# Patient Record
Sex: FEMALE | Race: WHITE | NOT HISPANIC OR LATINO | URBAN - METROPOLITAN AREA
[De-identification: names, ages, dates, MRNs, and addresses within clinical notes are randomized per-mention and may not be internally consistent; named-entity substitution may affect disease eponyms.]

---

## 2021-04-04 ENCOUNTER — APPOINTMENT (OUTPATIENT)
Dept: PREADMISSION TESTING | Facility: HOSPITAL | Age: 66
End: 2021-04-04

## 2021-04-04 PROCEDURE — C9803 HOPD COVID-19 SPEC COLLECT: HCPCS

## 2021-04-04 PROCEDURE — U0004 COV-19 TEST NON-CDC HGH THRU: HCPCS

## 2021-04-04 PROCEDURE — U0005 INFEC AGEN DETEC AMPLI PROBE: HCPCS

## 2021-04-05 LAB — SARS-COV-2 RNA NOSE QL NAA+PROBE: NOT DETECTED

## 2021-05-16 ENCOUNTER — APPOINTMENT (OUTPATIENT)
Dept: PREADMISSION TESTING | Facility: HOSPITAL | Age: 66
End: 2021-05-16

## 2021-05-16 PROCEDURE — U0004 COV-19 TEST NON-CDC HGH THRU: HCPCS

## 2021-05-16 PROCEDURE — C9803 HOPD COVID-19 SPEC COLLECT: HCPCS

## 2021-05-16 PROCEDURE — U0005 INFEC AGEN DETEC AMPLI PROBE: HCPCS

## 2021-05-17 LAB — SARS-COV-2 RNA NOSE QL NAA+PROBE: NOT DETECTED

## 2022-08-17 ENCOUNTER — OFFICE VISIT (OUTPATIENT)
Dept: UROLOGY | Facility: CLINIC | Age: 67
End: 2022-08-17

## 2022-08-17 ENCOUNTER — TELEPHONE (OUTPATIENT)
Dept: UROLOGY | Facility: CLINIC | Age: 67
End: 2022-08-17

## 2022-08-17 VITALS — BODY MASS INDEX: 33.31 KG/M2 | HEIGHT: 63 IN | WEIGHT: 188 LBS

## 2022-08-17 DIAGNOSIS — R31.0 GROSS HEMATURIA: Primary | ICD-10-CM

## 2022-08-17 PROCEDURE — 99204 OFFICE O/P NEW MOD 45 MIN: CPT | Performed by: UROLOGY

## 2022-08-17 RX ORDER — SIMVASTATIN 10 MG
10 TABLET ORAL DAILY
COMMUNITY
Start: 2022-06-25

## 2022-08-17 RX ORDER — MELOXICAM 7.5 MG/1
7.5 TABLET ORAL DAILY
COMMUNITY
Start: 2022-06-07 | End: 2023-02-10

## 2022-08-17 RX ORDER — ALPRAZOLAM 0.25 MG/1
0.25 TABLET ORAL 3 TIMES DAILY PRN
COMMUNITY
Start: 2022-04-08

## 2022-08-17 RX ORDER — AZELASTINE HYDROCHLORIDE 137 UG/1
1 SPRAY, METERED NASAL DAILY PRN
COMMUNITY
Start: 2022-06-25

## 2022-08-17 NOTE — TELEPHONE ENCOUNTER
DELETE AFTER REVIEWING: Telephone encounter to be sent to the clinical pool   Hub staff attempted to follow warm transfer process and was unsuccessful     Caller: Nelly Marcial    Relationship to patient: Self    Best call back number: [phone]    Patient is needing: PT IS NEEDING A 10 DAY FOLLOW UP APPT FOR RECHECK WITH DR. AUSTIN LUTHER

## 2022-08-17 NOTE — PROGRESS NOTES
Hematuria Female Office Visit      Patient Name: Nelly Marcial  : 1955   MRN: [id number]     Chief Complaint:  Gross hematuria.     Referring Provider: Noemi Bernardo MD    History of Present Illness: Ms. Marcial is a 67 y.o. female with history of gross hematuria.  The patient presents today for initial evaluation after recent episode of gross hematuria.  She was seen by primary care physician.  Urinalysis without concerning markers.  She denies past urologic evaluation.  She denies any prior episode of recurrent UTI or hematuria.  The patient is a never smoker.  She denies family history of  malignancy.      Subjective      Review of System: Review of Systems   Constitutional: Negative for chills, fatigue, fever and unexpected weight change.   HENT: Negative for sore throat.    Eyes: Negative for visual disturbance.   Respiratory: Negative for cough, chest tightness and shortness of breath.    Cardiovascular: Negative for chest pain and leg swelling.   Gastrointestinal: Negative for blood in stool, constipation, diarrhea, nausea, rectal pain and vomiting.   Genitourinary: Negative for decreased urine volume, difficulty urinating, dysuria, enuresis, flank pain, frequency, genital sores, hematuria and urgency.   Musculoskeletal: Negative for back pain and joint swelling.   Skin: Negative for rash and wound.   Neurological: Negative for seizures, speech difficulty, weakness and headaches.   Psychiatric/Behavioral: Negative for confusion, sleep disturbance and suicidal ideas. The patient is not nervous/anxious.       I have reviewed the ROS documented by my clinical staff, updated as appropriate and I agree. Stan Nettles MD    Past Medical History:   Past Medical History:   Diagnosis Date   • Cancer (HCC)     basal cell/skin   • Hyperlipidemia    • Kidney stone        Past Surgical History:   Past Surgical History:   Procedure Laterality Date   • BREAST SURGERY         • HAND SURGERY       "knuckle right hand   • SHOULDER SURGERY         Family History:   Family History   Problem Relation Age of Onset   • Pulmonary fibrosis Father    • Diabetes Mother    • Cancer Sister    • Nephrolithiasis Sister        Social History:   Social History     Socioeconomic History   • Marital status:    Tobacco Use   • Smoking status: Never Smoker   Vaping Use   • Vaping Use: Never used   Substance and Sexual Activity   • Alcohol use: Never   • Drug use: Never   • Sexual activity: Defer       Medications:     Current Outpatient Medications:   •  ALPRAZolam (XANAX) 0.25 MG tablet, TAKE 1 TABLET (0.25 MG) BY ORAL ROUTE UP TO 3 TIMES PER DAY AS NEEDED, Disp: , Rfl:   •  Azelastine HCl 137 MCG/SPRAY solution, ADMINISTER 1 SPRAY INTO EACH NOSTRIL 2 TIMES A DAY AS DIRECTED, Disp: , Rfl:   •  meloxicam (MOBIC) 7.5 MG tablet, Take 7.5 mg by mouth Daily., Disp: , Rfl:   •  simvastatin (ZOCOR) 10 MG tablet, Take 10 mg by mouth Daily., Disp: , Rfl:   •  tretinoin (RETIN-A) 0.025 % cream, PLEASE SEE ATTACHED FOR DETAILED DIRECTIONS, Disp: , Rfl:     Allergies:   Allergies   Allergen Reactions   • Dust Mite Extract Unknown (See Comments)   • Molds & Smuts Unknown (See Comments)   • Shellfish Allergy Unknown (See Comments)   • Sulfa Antibiotics Rash   • Wheat Unknown (See Comments)         Objective     Physical Exam:   Vital Signs:   Vitals:    08/17/22 1254   Weight: 85.3 kg (188 lb)   Height: 160 cm (63\")   PainSc: 0-No pain     Body mass index is 33.3 kg/m².     Physical Exam  Vitals and nursing note reviewed.   Constitutional:       Appearance: Normal appearance.   HENT:      Head: Normocephalic and atraumatic.   Cardiovascular:      Comments: Well perfused  Pulmonary:      Effort: Pulmonary effort is normal.   Abdominal:      General: Abdomen is flat.      Palpations: Abdomen is soft.   Musculoskeletal:         General: Normal range of motion.   Skin:     General: Skin is warm and dry.   Neurological:      General: No " focal deficit present.      Mental Status: She is alert and oriented to person, place, and time. Mental status is at baseline.   Psychiatric:         Mood and Affect: Mood normal.         Behavior: Behavior normal.         Thought Content: Thought content normal.         Judgment: Judgment normal.         Labs:   Brief Urine Lab Results  (Last result in the past 365 days)      Color   Clarity   Blood   Leuk Est   Nitrite   Protein   CREAT   Urine HCG        07/21/22 0843 Yellow   Clear     Trace                          Lab Results   Component Value Date    CALCIUM 9.3 07/21/2022     07/21/2022    K 4.1 07/21/2022    CO2 24 07/21/2022     07/21/2022    BUN 12 07/21/2022    CREATININE 0.81 07/21/2022    EGFRIFAFRI >60 07/21/2022    EGFRIFNONA >60 07/21/2022    BCR 15 07/21/2022    ANIONGAP 11 07/21/2022       Lab Results   Component Value Date    WBC 6.92 07/21/2022    HGB 14.8 07/21/2022    HCT 45.2 (H) 07/21/2022    MCV 91 07/21/2022     (H) 07/21/2022       Images:   No Images in the past 120 days found..    Measures:   Tobacco:   Nelly Marcial  reports that she has never smoked. She does not have any smokeless tobacco history on file.. I have educated her on the risk of diseases from using tobacco products.           Urine Incontinence: ( NOUI)  Patient reports that she is not currently experiencing any symptoms of urinary incontinence.      Assessment / Plan      Assessment/Plan:   67 y.o. female who presented today for evaluation of pain less gross hematuria.  The patient denies significant history of lower urinary tract symptoms.  Patient is a never smoker.  She denies past urologic evaluation including instrumentation or procedure.    Diagnoses and all orders for this visit:    1. Gross hematuria (Primary)  -     CT Abdomen Pelvis With & Without Contrast; Future         Patient Education:   The patient was counseled regarding the possible etiologies, relevant work-up and diagnostic  "approach for gross hematuria, as well as the relevant risk categories as assigned by the American U[employer] Association guidelines.  I discussed that the definition of microscopic hematuria includes a microscopic urinalysis (not dipstick UA) positive for greater than 3 RBCs per high-powered field under microscopy.  We also discussed that any history of gross hematuria (or visible hematuria) places a patient at higher risk for occult urologic malignancies and necessitates prompt workup.  We discussed the aforementioned risk categories as assigned by the AUA, which are depicted below.  Ultimately, work-up is mandatory for gross or visible hematuria, as indicated by the \"HIGH RISK\" category and recommendations as depicted by the AUA Guidelines.  Given the patient's age,report of gross hematuria the patient is deemed HIGH risk, and for these reasons I recommend proceeding with a flexible diagnostic cystoscopy and CT urogram to assess the collecting system of the kidney and bladder.              Follow Up:   Return in about 10 days (around 8/27/2022) for Recheck, Follow up after Imaging.    I spent approximately 45 minutes providing clinical care for this patient; including review of patient's chart and provider documentation, face to face time spent with patient in examination room (obtaining history, performing physical exam, discussing diagnosis and management options), placing orders, and completing patient documentation.     Stan Nettles MD  [facility] [place]   "

## 2022-08-24 ENCOUNTER — HOSPITAL ENCOUNTER (OUTPATIENT)
Dept: CT IMAGING | Facility: HOSPITAL | Age: 67
Discharge: HOME | End: 2022-08-24
Admitting: UROLOGY

## 2022-08-24 DIAGNOSIS — R31.0 GROSS HEMATURIA: ICD-10-CM

## 2022-08-24 LAB — CREAT BLDA-MCNC: 0.7 MG/DL (ref 0.6–1.3)

## 2022-08-24 PROCEDURE — 82565 ASSAY OF CREATININE: CPT

## 2022-08-24 PROCEDURE — 74178 CT ABD&PLV WO CNTR FLWD CNTR: CPT

## 2022-08-24 PROCEDURE — 0 IOPAMIDOL PER 1 ML: Performed by: UROLOGY

## 2022-08-24 RX ADMIN — IOPAMIDOL 150 ML: 755 INJECTION, SOLUTION INTRAVENOUS at 11:39

## 2022-09-08 ENCOUNTER — PROCEDURE VISIT (OUTPATIENT)
Dept: UROLOGY | Facility: CLINIC | Age: 67
End: 2022-09-08

## 2022-09-08 DIAGNOSIS — N20.0 NEPHROLITHIASIS: Primary | ICD-10-CM

## 2022-09-08 PROCEDURE — 99215 OFFICE O/P EST HI 40 MIN: CPT | Performed by: UROLOGY

## 2022-09-09 ENCOUNTER — PREP FOR SURGERY (OUTPATIENT)
Dept: OTHER | Facility: HOSPITAL | Age: 67
End: 2022-09-09

## 2022-09-09 DIAGNOSIS — N20.0 NEPHROLITHIASIS: Primary | ICD-10-CM

## 2022-09-09 RX ORDER — SCOLOPAMINE TRANSDERMAL SYSTEM 1 MG/1
1 PATCH, EXTENDED RELEASE TRANSDERMAL CONTINUOUS
Status: CANCELLED | OUTPATIENT
Start: 2022-09-09 | End: 2022-09-12

## 2022-09-09 RX ORDER — CEFAZOLIN SODIUM 2 G/100ML
2 INJECTION, SOLUTION INTRAVENOUS ONCE
Status: CANCELLED | OUTPATIENT
Start: 2022-09-09 | End: 2022-09-09

## 2022-09-09 RX ORDER — GABAPENTIN 300 MG/1
600 CAPSULE ORAL ONCE
Status: CANCELLED | OUTPATIENT
Start: 2022-09-09 | End: 2022-09-09

## 2022-09-09 RX ORDER — ACETAMINOPHEN 500 MG
1000 TABLET ORAL ONCE
Status: CANCELLED | OUTPATIENT
Start: 2022-09-09 | End: 2022-09-09

## 2022-09-09 NOTE — H&P (VIEW-ONLY)
Follow Up Office Visit      Patient Name: Juan Marcial  : 1955   MRN: [id number]     Chief Complaint: Gross hematuria    History of Present Illness: Juan Marcial is a 67 y.o. female who presents today for follow up evaluation due to recent history of gross hematuria.  She presents today with CT urography and for cystoscopy.  CT urography has demonstrated large volume bilateral nonobstructing stone burden.  No concerning renal lesions, hydroureteronephrosis, collecting filling system defect.  She denies current flank pain.  She denies lower urinary tract symptoms.  She denies prior history of stone disease.  She reports a family history of stone disease in her sister.    Subjective      Review of System: Review of Systems   Constitutional: Negative for chills, fatigue, fever and unexpected weight change.   HENT: Negative for sore throat.    Eyes: Negative for visual disturbance.   Respiratory: Negative for cough, chest tightness and shortness of breath.    Cardiovascular: Negative for chest pain and leg swelling.   Gastrointestinal: Negative for blood in stool, constipation, diarrhea, nausea, rectal pain and vomiting.   Genitourinary: Negative for decreased urine volume, difficulty urinating, dysuria, enuresis, flank pain, frequency, genital sores, hematuria and urgency.   Musculoskeletal: Negative for back pain and joint swelling.   Skin: Negative for rash and wound.   Neurological: Negative for seizures, speech difficulty, weakness and headaches.   Psychiatric/Behavioral: Negative for confusion, sleep disturbance and suicidal ideas. The patient is not nervous/anxious.       I have reviewed the ROS documented by my clinical staff, updated as appropriate and I agree. Stan Nettles MD    I have reviewed and the following portions of the patient's history were updated as appropriate: past family history, past medical history, past social history, past surgical history and problem  list.    Medications:     Current Outpatient Medications:   •  ALPRAZolam (XANAX) 0.25 MG tablet, TAKE 1 TABLET (0.25 MG) BY ORAL ROUTE UP TO 3 TIMES PER DAY AS NEEDED, Disp: , Rfl:   •  Azelastine HCl 137 MCG/SPRAY solution, ADMINISTER 1 SPRAY INTO EACH NOSTRIL 2 TIMES A DAY AS DIRECTED, Disp: , Rfl:   •  meloxicam (MOBIC) 7.5 MG tablet, Take 7.5 mg by mouth Daily., Disp: , Rfl:   •  simvastatin (ZOCOR) 10 MG tablet, Take 10 mg by mouth Daily., Disp: , Rfl:   •  tretinoin (RETIN-A) 0.025 % cream, PLEASE SEE ATTACHED FOR DETAILED DIRECTIONS, Disp: , Rfl:     Allergies:   Allergies   Allergen Reactions   • Dust Mite Extract Unknown (See Comments)   • Molds & Smuts Unknown (See Comments)   • Shellfish Allergy Unknown (See Comments)   • Sulfa Antibiotics Rash   • Wheat Unknown (See Comments)         Objective     Physical Exam:   Vital Signs: There were no vitals filed for this visit.  There is no height or weight on file to calculate BMI.     Physical Exam  Vitals and nursing note reviewed.   Constitutional:       Appearance: Normal appearance.   HENT:      Head: Normocephalic and atraumatic.   Cardiovascular:      Comments: Well perfused  Pulmonary:      Effort: Pulmonary effort is normal.   Abdominal:      General: Abdomen is flat.      Palpations: Abdomen is soft.   Musculoskeletal:         General: Normal range of motion.   Skin:     General: Skin is warm and dry.   Neurological:      General: No focal deficit present.      Mental Status: She is alert and oriented to person, place, and time. Mental status is at baseline.   Psychiatric:         Mood and Affect: Mood normal.         Behavior: Behavior normal.         Thought Content: Thought content normal.         Judgment: Judgment normal.         Labs:   Brief Urine Lab Results  (Last result in the past 365 days)      Color   Clarity   Blood   Leuk Est   Nitrite   Protein   CREAT   Urine HCG        07/21/22 0843 Yellow   Clear     Trace                           Lab Results   Component Value Date    CALCIUM 9.3 07/21/2022     07/21/2022    K 4.1 07/21/2022    CO2 24 07/21/2022     07/21/2022    BUN 12 07/21/2022    CREATININE 0.70 08/24/2022    EGFRIFAFRI >60 07/21/2022    EGFRIFNONA >60 07/21/2022    BCR 15 07/21/2022    ANIONGAP 11 07/21/2022       Lab Results   Component Value Date    WBC 6.92 07/21/2022    HGB 14.8 07/21/2022    HCT 45.2 (H) 07/21/2022    MCV 91 07/21/2022     (H) 07/21/2022       Images:   CT Abdomen Pelvis With & Without Contrast    Result Date: 8/24/2022  1.  Nonobstructive stones are noted within the central aspect of the collecting system of the kidneys bilaterally measuring up to 8 mm. There is no evidence for associated obstructive uropathy. 2.  No evidence for abnormal renal enhancement or enhancing mass. Renal cysts are identified bilaterally. 3.  Evidence for rectal prolapse.  This report was finalized on 8/24/2022 4:25 PM by Kb De Souza MD.        Measures:   Tobacco:   Juan Marcial  reports that she has never smoked. She does not have any smokeless tobacco history on file.. I have educated her on the risk of diseases from using tobacco products.           Urine Incontinence: ( NOUI)  Patient reports that she is not currently experiencing any symptoms of urinary incontinence.    Assessment / Plan      Assessment/Plan:   67 y.o. female is seen today for follow up due to history of gross hematuria.  She presents today with CT urography.  Imaging has demonstrated large volume bilateral nonobstructing stone burden.  Stone burden on the right approximately 10 mm x 8 mm in the renal pelvis.  Stone burden on the left approximately 8 mm x 8 mm.  She has no evidence of hydronephrosis.  Imaging did not demonstrate concerning renal lesion, filling system defect.  The patient was scheduled for cystoscopy but we have discussed the indication for intervention based upon her large nonobstructing stone burden.  We have  discussed we will perform cystoscopy at the time of procedure to complete hematuria work-up.  We have discussed indication for treatment of stone burden due to large nature of stone size and she is agreeable.  We have discussed procedures including ESWL versus ureteroscopy.  We have discussed the risks and benefits of each.  After our discussion the patient elects for ureteroscopy.  We have discussed specific risks of this procedure including placement of ureteral stent postoperatively.  We have discussed that ureteral stents or not permanent she must follow-up for removal.  We have discussed the staged nature of her procedures as we will be performing right sided procedure, single-sided operation.  We will then schedule her for second sided left procedure following completion of her right-sided operation.  She is understanding agreeable.  We will obtain a urine culture today preoperatively.  We will plan for right ureteroscopy and laser lithotripsy with stent placement 9/23/2022.    Diagnoses and all orders for this visit:    1. Nephrolithiasis (Primary)  -     Urine Culture - Urine, Urine, Random Void; Future        I spent approximately 40 minutes providing clinical care for this patient; including review of patient's chart and provider documentation, face to face time spent with patient in examination room (obtaining history, performing physical exam, discussing diagnosis and management options), placing orders, and completing patient documentation.     Stan Nettles MD  [facility] [place]

## 2022-09-09 NOTE — PROGRESS NOTES
Follow Up Office Visit      Patient Name: Juan Marcial  : 1955   MRN: [id number]     Chief Complaint: Gross hematuria    History of Present Illness: Juan Marcial is a 67 y.o. female who presents today for follow up evaluation due to recent history of gross hematuria.  She presents today with CT urography and for cystoscopy.  CT urography has demonstrated large volume bilateral nonobstructing stone burden.  No concerning renal lesions, hydroureteronephrosis, collecting filling system defect.  She denies current flank pain.  She denies lower urinary tract symptoms.  She denies prior history of stone disease.  She reports a family history of stone disease in her sister.    Subjective      Review of System: Review of Systems   Constitutional: Negative for chills, fatigue, fever and unexpected weight change.   HENT: Negative for sore throat.    Eyes: Negative for visual disturbance.   Respiratory: Negative for cough, chest tightness and shortness of breath.    Cardiovascular: Negative for chest pain and leg swelling.   Gastrointestinal: Negative for blood in stool, constipation, diarrhea, nausea, rectal pain and vomiting.   Genitourinary: Negative for decreased urine volume, difficulty urinating, dysuria, enuresis, flank pain, frequency, genital sores, hematuria and urgency.   Musculoskeletal: Negative for back pain and joint swelling.   Skin: Negative for rash and wound.   Neurological: Negative for seizures, speech difficulty, weakness and headaches.   Psychiatric/Behavioral: Negative for confusion, sleep disturbance and suicidal ideas. The patient is not nervous/anxious.       I have reviewed the ROS documented by my clinical staff, updated as appropriate and I agree. Stan Nettles MD    I have reviewed and the following portions of the patient's history were updated as appropriate: past family history, past medical history, past social history, past surgical history and problem  list.    Medications:     Current Outpatient Medications:   •  ALPRAZolam (XANAX) 0.25 MG tablet, TAKE 1 TABLET (0.25 MG) BY ORAL ROUTE UP TO 3 TIMES PER DAY AS NEEDED, Disp: , Rfl:   •  Azelastine HCl 137 MCG/SPRAY solution, ADMINISTER 1 SPRAY INTO EACH NOSTRIL 2 TIMES A DAY AS DIRECTED, Disp: , Rfl:   •  meloxicam (MOBIC) 7.5 MG tablet, Take 7.5 mg by mouth Daily., Disp: , Rfl:   •  simvastatin (ZOCOR) 10 MG tablet, Take 10 mg by mouth Daily., Disp: , Rfl:   •  tretinoin (RETIN-A) 0.025 % cream, PLEASE SEE ATTACHED FOR DETAILED DIRECTIONS, Disp: , Rfl:     Allergies:   Allergies   Allergen Reactions   • Dust Mite Extract Unknown (See Comments)   • Molds & Smuts Unknown (See Comments)   • Shellfish Allergy Unknown (See Comments)   • Sulfa Antibiotics Rash   • Wheat Unknown (See Comments)         Objective     Physical Exam:   Vital Signs: There were no vitals filed for this visit.  There is no height or weight on file to calculate BMI.     Physical Exam  Vitals and nursing note reviewed.   Constitutional:       Appearance: Normal appearance.   HENT:      Head: Normocephalic and atraumatic.   Cardiovascular:      Comments: Well perfused  Pulmonary:      Effort: Pulmonary effort is normal.   Abdominal:      General: Abdomen is flat.      Palpations: Abdomen is soft.   Musculoskeletal:         General: Normal range of motion.   Skin:     General: Skin is warm and dry.   Neurological:      General: No focal deficit present.      Mental Status: She is alert and oriented to person, place, and time. Mental status is at baseline.   Psychiatric:         Mood and Affect: Mood normal.         Behavior: Behavior normal.         Thought Content: Thought content normal.         Judgment: Judgment normal.         Labs:   Brief Urine Lab Results  (Last result in the past 365 days)      Color   Clarity   Blood   Leuk Est   Nitrite   Protein   CREAT   Urine HCG        07/21/22 0843 Yellow   Clear     Trace                           Lab Results   Component Value Date    CALCIUM 9.3 07/21/2022     07/21/2022    K 4.1 07/21/2022    CO2 24 07/21/2022     07/21/2022    BUN 12 07/21/2022    CREATININE 0.70 08/24/2022    EGFRIFAFRI >60 07/21/2022    EGFRIFNONA >60 07/21/2022    BCR 15 07/21/2022    ANIONGAP 11 07/21/2022       Lab Results   Component Value Date    WBC 6.92 07/21/2022    HGB 14.8 07/21/2022    HCT 45.2 (H) 07/21/2022    MCV 91 07/21/2022     (H) 07/21/2022       Images:   CT Abdomen Pelvis With & Without Contrast    Result Date: 8/24/2022  1.  Nonobstructive stones are noted within the central aspect of the collecting system of the kidneys bilaterally measuring up to 8 mm. There is no evidence for associated obstructive uropathy. 2.  No evidence for abnormal renal enhancement or enhancing mass. Renal cysts are identified bilaterally. 3.  Evidence for rectal prolapse.  This report was finalized on 8/24/2022 4:25 PM by Kb De Souza MD.        Measures:   Tobacco:   Juan Marcial  reports that she has never smoked. She does not have any smokeless tobacco history on file.. I have educated her on the risk of diseases from using tobacco products.           Urine Incontinence: ( NOUI)  Patient reports that she is not currently experiencing any symptoms of urinary incontinence.    Assessment / Plan      Assessment/Plan:   67 y.o. female is seen today for follow up due to history of gross hematuria.  She presents today with CT urography.  Imaging has demonstrated large volume bilateral nonobstructing stone burden.  Stone burden on the right approximately 10 mm x 8 mm in the renal pelvis.  Stone burden on the left approximately 8 mm x 8 mm.  She has no evidence of hydronephrosis.  Imaging did not demonstrate concerning renal lesion, filling system defect.  The patient was scheduled for cystoscopy but we have discussed the indication for intervention based upon her large nonobstructing stone burden.  We have  discussed we will perform cystoscopy at the time of procedure to complete hematuria work-up.  We have discussed indication for treatment of stone burden due to large nature of stone size and she is agreeable.  We have discussed procedures including ESWL versus ureteroscopy.  We have discussed the risks and benefits of each.  After our discussion the patient elects for ureteroscopy.  We have discussed specific risks of this procedure including placement of ureteral stent postoperatively.  We have discussed that ureteral stents or not permanent she must follow-up for removal.  We have discussed the staged nature of her procedures as we will be performing right sided procedure, single-sided operation.  We will then schedule her for second sided left procedure following completion of her right-sided operation.  She is understanding agreeable.  We will obtain a urine culture today preoperatively.  We will plan for right ureteroscopy and laser lithotripsy with stent placement 9/23/2022.    Diagnoses and all orders for this visit:    1. Nephrolithiasis (Primary)  -     Urine Culture - Urine, Urine, Random Void; Future        I spent approximately 40 minutes providing clinical care for this patient; including review of patient's chart and provider documentation, face to face time spent with patient in examination room (obtaining history, performing physical exam, discussing diagnosis and management options), placing orders, and completing patient documentation.     Stan Nettles MD  [facility] [place]

## 2022-09-19 ENCOUNTER — TELEPHONE (OUTPATIENT)
Dept: UROLOGY | Facility: CLINIC | Age: 67
End: 2022-09-19

## 2022-09-19 NOTE — TELEPHONE ENCOUNTER
"Patient left message:   Patient states she started to have \"quite a bit of blood in urine this morning.\"      Patient would like to know if this is a problem moving forward with surgery on 09/23/22  "

## 2022-09-20 ENCOUNTER — PRE-ADMISSION TESTING (OUTPATIENT)
Dept: PREADMISSION TESTING | Facility: HOSPITAL | Age: 67
End: 2022-09-20

## 2022-09-20 VITALS — HEIGHT: 63 IN | BODY MASS INDEX: 33.48 KG/M2 | WEIGHT: 188.93 LBS

## 2022-09-20 DIAGNOSIS — N20.0 NEPHROLITHIASIS: ICD-10-CM

## 2022-09-20 LAB
DEPRECATED RDW RBC AUTO: 43.2 FL (ref 37–54)
ERYTHROCYTE [DISTWIDTH] IN BLOOD BY AUTOMATED COUNT: 13 % (ref 12.3–15.4)
HBA1C MFR BLD: 5.5 % (ref 4.8–5.6)
HCT VFR BLD AUTO: 44.7 % (ref 34–46.6)
HGB BLD-MCNC: 14.5 G/DL (ref 12–15.9)
MCH RBC QN AUTO: 29.4 PG (ref 26.6–33)
MCHC RBC AUTO-ENTMCNC: 32.4 G/DL (ref 31.5–35.7)
MCV RBC AUTO: 90.7 FL (ref 79–97)
PLATELET # BLD AUTO: 337 10*3/MM3 (ref 140–450)
PMV BLD AUTO: 9.6 FL (ref 6–12)
RBC # BLD AUTO: 4.93 10*6/MM3 (ref 3.77–5.28)
WBC NRBC COR # BLD: 7.1 10*3/MM3 (ref 3.4–10.8)

## 2022-09-20 PROCEDURE — 36415 COLL VENOUS BLD VENIPUNCTURE: CPT

## 2022-09-20 PROCEDURE — 87086 URINE CULTURE/COLONY COUNT: CPT

## 2022-09-20 PROCEDURE — 85027 COMPLETE CBC AUTOMATED: CPT

## 2022-09-20 PROCEDURE — 83036 HEMOGLOBIN GLYCOSYLATED A1C: CPT

## 2022-09-21 ENCOUNTER — TELEPHONE (OUTPATIENT)
Dept: UROLOGY | Facility: CLINIC | Age: 67
End: 2022-09-21

## 2022-09-21 DIAGNOSIS — Z41.9 SURGICAL PROCEDURE, ELECTIVE: Primary | ICD-10-CM

## 2022-09-21 DIAGNOSIS — N20.0 RENAL STONE: Primary | ICD-10-CM

## 2022-09-21 LAB — BACTERIA SPEC AEROBE CULT: NO GROWTH

## 2022-09-21 RX ORDER — CEFDINIR 300 MG/1
300 CAPSULE ORAL 2 TIMES DAILY
Qty: 14 CAPSULE | Refills: 0 | Status: ON HOLD | OUTPATIENT
Start: 2022-09-21 | End: 2022-09-23

## 2022-09-21 RX ORDER — NITROFURANTOIN 25; 75 MG/1; MG/1
100 CAPSULE ORAL 2 TIMES DAILY
Qty: 14 CAPSULE | Refills: 0 | Status: SHIPPED | OUTPATIENT
Start: 2022-09-21 | End: 2022-09-29

## 2022-09-21 NOTE — TELEPHONE ENCOUNTER
----- Message from Stan Nettles MD sent at 9/21/2022  7:21 AM EDT -----  Can we alert patient pre-op ucx was no growth but still sent pre-surgery abx to pharmacy to pick and start taking prior to procedure Friday. Thanks

## 2022-09-21 NOTE — TELEPHONE ENCOUNTER
Patient stated she is allergic to sulfa drugs, she stated the pharmacy told her that cefdinir is sulfa drug. Please advise.

## 2022-09-23 ENCOUNTER — APPOINTMENT (OUTPATIENT)
Dept: GENERAL RADIOLOGY | Facility: HOSPITAL | Age: 67
End: 2022-09-23

## 2022-09-23 ENCOUNTER — HOSPITAL ENCOUNTER (OUTPATIENT)
Facility: HOSPITAL | Age: 67
Setting detail: HOSPITAL OUTPATIENT SURGERY
Discharge: HOME | End: 2022-09-23
Attending: UROLOGY | Admitting: UROLOGY

## 2022-09-23 ENCOUNTER — ANESTHESIA EVENT (OUTPATIENT)
Dept: PERIOP | Facility: HOSPITAL | Age: 67
End: 2022-09-23

## 2022-09-23 ENCOUNTER — ANESTHESIA (OUTPATIENT)
Dept: PERIOP | Facility: HOSPITAL | Age: 67
End: 2022-09-23

## 2022-09-23 VITALS
HEIGHT: 63 IN | TEMPERATURE: 97.6 F | WEIGHT: 188.93 LBS | RESPIRATION RATE: 16 BRPM | SYSTOLIC BLOOD PRESSURE: 116 MMHG | HEART RATE: 80 BPM | DIASTOLIC BLOOD PRESSURE: 74 MMHG | BODY MASS INDEX: 33.48 KG/M2 | OXYGEN SATURATION: 97 %

## 2022-09-23 DIAGNOSIS — N20.0 NEPHROLITHIASIS: ICD-10-CM

## 2022-09-23 PROCEDURE — 25010000002 ONDANSETRON PER 1 MG

## 2022-09-23 PROCEDURE — 25010000002 PROPOFOL 10 MG/ML EMULSION

## 2022-09-23 PROCEDURE — C2617 STENT, NON-COR, TEM W/O DEL: HCPCS | Performed by: UROLOGY

## 2022-09-23 PROCEDURE — 76000 FLUOROSCOPY <1 HR PHYS/QHP: CPT

## 2022-09-23 PROCEDURE — 25010000002 CEFAZOLIN IN DEXTROSE 2-4 GM/100ML-% SOLUTION: Performed by: UROLOGY

## 2022-09-23 PROCEDURE — C1769 GUIDE WIRE: HCPCS | Performed by: UROLOGY

## 2022-09-23 PROCEDURE — 25010000002 FENTANYL CITRATE (PF) 50 MCG/ML SOLUTION

## 2022-09-23 PROCEDURE — 52356 CYSTO/URETERO W/LITHOTRIPSY: CPT | Performed by: UROLOGY

## 2022-09-23 PROCEDURE — C1894 INTRO/SHEATH, NON-LASER: HCPCS | Performed by: UROLOGY

## 2022-09-23 PROCEDURE — 25010000002 DEXAMETHASONE PER 1 MG

## 2022-09-23 PROCEDURE — 25010000002 IOPAMIDOL 61 % SOLUTION: Performed by: UROLOGY

## 2022-09-23 PROCEDURE — 25010000002 GENTAMICIN PER 80 MG

## 2022-09-23 PROCEDURE — 74420 UROGRAPHY RTRGR +-KUB: CPT | Performed by: UROLOGY

## 2022-09-23 DEVICE — URETERAL STENT
Type: IMPLANTABLE DEVICE | Site: KIDNEY | Status: FUNCTIONAL
Brand: PERCUFLEX™ PLUS

## 2022-09-23 RX ORDER — HYDROCODONE BITARTRATE AND ACETAMINOPHEN 5; 325 MG/1; MG/1
1 TABLET ORAL ONCE AS NEEDED
Status: DISCONTINUED | OUTPATIENT
Start: 2022-09-23 | End: 2022-09-27 | Stop reason: HOSPADM

## 2022-09-23 RX ORDER — LIDOCAINE HYDROCHLORIDE 10 MG/ML
0.5 INJECTION, SOLUTION EPIDURAL; INFILTRATION; INTRACAUDAL; PERINEURAL ONCE AS NEEDED
Status: COMPLETED | OUTPATIENT
Start: 2022-09-23 | End: 2022-09-23

## 2022-09-23 RX ORDER — PHENAZOPYRIDINE HYDROCHLORIDE 200 MG/1
200 TABLET, FILM COATED ORAL 3 TIMES DAILY PRN
Qty: 20 TABLET | Refills: 0 | Status: SHIPPED | OUTPATIENT
Start: 2022-09-23 | End: 2023-02-10

## 2022-09-23 RX ORDER — DROPERIDOL 2.5 MG/ML
0.62 INJECTION, SOLUTION INTRAMUSCULAR; INTRAVENOUS
Status: DISCONTINUED | OUTPATIENT
Start: 2022-09-23 | End: 2022-09-27 | Stop reason: HOSPADM

## 2022-09-23 RX ORDER — MAGNESIUM HYDROXIDE 1200 MG/15ML
LIQUID ORAL AS NEEDED
Status: DISCONTINUED | OUTPATIENT
Start: 2022-09-23 | End: 2022-09-23 | Stop reason: HOSPADM

## 2022-09-23 RX ORDER — MEPERIDINE HYDROCHLORIDE 25 MG/ML
12.5 INJECTION INTRAMUSCULAR; INTRAVENOUS; SUBCUTANEOUS
Status: DISCONTINUED | OUTPATIENT
Start: 2022-09-23 | End: 2022-09-24 | Stop reason: HOSPADM

## 2022-09-23 RX ORDER — TAMSULOSIN HYDROCHLORIDE 0.4 MG/1
1 CAPSULE ORAL DAILY
Qty: 14 CAPSULE | Refills: 0 | Status: SHIPPED | OUTPATIENT
Start: 2022-09-23 | End: 2022-10-07

## 2022-09-23 RX ORDER — DROPERIDOL 2.5 MG/ML
0.62 INJECTION, SOLUTION INTRAMUSCULAR; INTRAVENOUS ONCE AS NEEDED
Status: DISCONTINUED | OUTPATIENT
Start: 2022-09-23 | End: 2022-09-27 | Stop reason: HOSPADM

## 2022-09-23 RX ORDER — SODIUM CHLORIDE 0.9 % (FLUSH) 0.9 %
10 SYRINGE (ML) INJECTION AS NEEDED
Status: DISCONTINUED | OUTPATIENT
Start: 2022-09-23 | End: 2022-09-23 | Stop reason: HOSPADM

## 2022-09-23 RX ORDER — PROPOFOL 10 MG/ML
VIAL (ML) INTRAVENOUS AS NEEDED
Status: DISCONTINUED | OUTPATIENT
Start: 2022-09-23 | End: 2022-09-23 | Stop reason: SURG

## 2022-09-23 RX ORDER — FAMOTIDINE 20 MG/1
20 TABLET, FILM COATED ORAL
Status: DISCONTINUED | OUTPATIENT
Start: 2022-09-23 | End: 2022-09-23 | Stop reason: SDUPTHER

## 2022-09-23 RX ORDER — SODIUM CHLORIDE 0.9 % (FLUSH) 0.9 %
10 SYRINGE (ML) INJECTION EVERY 12 HOURS SCHEDULED
Status: DISCONTINUED | OUTPATIENT
Start: 2022-09-23 | End: 2022-09-23 | Stop reason: HOSPADM

## 2022-09-23 RX ORDER — CEFAZOLIN SODIUM 2 G/100ML
2 INJECTION, SOLUTION INTRAVENOUS ONCE
Status: COMPLETED | OUTPATIENT
Start: 2022-09-23 | End: 2022-09-23

## 2022-09-23 RX ORDER — FENTANYL CITRATE 50 UG/ML
INJECTION, SOLUTION INTRAMUSCULAR; INTRAVENOUS AS NEEDED
Status: DISCONTINUED | OUTPATIENT
Start: 2022-09-23 | End: 2022-09-23 | Stop reason: SURG

## 2022-09-23 RX ORDER — FAMOTIDINE 20 MG/1
20 TABLET, FILM COATED ORAL ONCE
Status: COMPLETED | OUTPATIENT
Start: 2022-09-23 | End: 2022-09-23

## 2022-09-23 RX ORDER — ACETAMINOPHEN 500 MG
1000 TABLET ORAL ONCE
Status: COMPLETED | OUTPATIENT
Start: 2022-09-23 | End: 2022-09-23

## 2022-09-23 RX ORDER — HYDRALAZINE HYDROCHLORIDE 20 MG/ML
5 INJECTION INTRAMUSCULAR; INTRAVENOUS
Status: DISCONTINUED | OUTPATIENT
Start: 2022-09-23 | End: 2022-09-27 | Stop reason: HOSPADM

## 2022-09-23 RX ORDER — ONDANSETRON 2 MG/ML
INJECTION INTRAMUSCULAR; INTRAVENOUS AS NEEDED
Status: DISCONTINUED | OUTPATIENT
Start: 2022-09-23 | End: 2022-09-23 | Stop reason: SURG

## 2022-09-23 RX ORDER — LABETALOL HYDROCHLORIDE 5 MG/ML
5 INJECTION, SOLUTION INTRAVENOUS
Status: DISCONTINUED | OUTPATIENT
Start: 2022-09-23 | End: 2022-09-27 | Stop reason: HOSPADM

## 2022-09-23 RX ORDER — ONDANSETRON 2 MG/ML
4 INJECTION INTRAMUSCULAR; INTRAVENOUS ONCE AS NEEDED
Status: DISCONTINUED | OUTPATIENT
Start: 2022-09-23 | End: 2022-09-27 | Stop reason: HOSPADM

## 2022-09-23 RX ORDER — IPRATROPIUM BROMIDE AND ALBUTEROL SULFATE 2.5; .5 MG/3ML; MG/3ML
3 SOLUTION RESPIRATORY (INHALATION) ONCE AS NEEDED
Status: DISCONTINUED | OUTPATIENT
Start: 2022-09-23 | End: 2022-09-27 | Stop reason: HOSPADM

## 2022-09-23 RX ORDER — DEXAMETHASONE SODIUM PHOSPHATE 4 MG/ML
INJECTION, SOLUTION INTRA-ARTICULAR; INTRALESIONAL; INTRAMUSCULAR; INTRAVENOUS; SOFT TISSUE AS NEEDED
Status: DISCONTINUED | OUTPATIENT
Start: 2022-09-23 | End: 2022-09-23 | Stop reason: SURG

## 2022-09-23 RX ORDER — SODIUM CHLORIDE, SODIUM LACTATE, POTASSIUM CHLORIDE, CALCIUM CHLORIDE 600; 310; 30; 20 MG/100ML; MG/100ML; MG/100ML; MG/100ML
9 INJECTION, SOLUTION INTRAVENOUS CONTINUOUS PRN
Status: DISCONTINUED | OUTPATIENT
Start: 2022-09-23 | End: 2022-09-27 | Stop reason: HOSPADM

## 2022-09-23 RX ORDER — SODIUM CHLORIDE 0.9 % (FLUSH) 0.9 %
3 SYRINGE (ML) INJECTION EVERY 12 HOURS SCHEDULED
Status: DISCONTINUED | OUTPATIENT
Start: 2022-09-23 | End: 2022-09-27 | Stop reason: HOSPADM

## 2022-09-23 RX ORDER — PROMETHAZINE HYDROCHLORIDE 25 MG/1
25 TABLET ORAL ONCE AS NEEDED
Status: DISCONTINUED | OUTPATIENT
Start: 2022-09-23 | End: 2022-09-27 | Stop reason: HOSPADM

## 2022-09-23 RX ORDER — EPHEDRINE SULFATE 50 MG/ML
INJECTION, SOLUTION INTRAVENOUS AS NEEDED
Status: DISCONTINUED | OUTPATIENT
Start: 2022-09-23 | End: 2022-09-23 | Stop reason: SURG

## 2022-09-23 RX ORDER — SODIUM CHLORIDE 0.9 % (FLUSH) 0.9 %
3-10 SYRINGE (ML) INJECTION AS NEEDED
Status: DISCONTINUED | OUTPATIENT
Start: 2022-09-23 | End: 2022-09-27 | Stop reason: HOSPADM

## 2022-09-23 RX ORDER — SODIUM CHLORIDE, SODIUM LACTATE, POTASSIUM CHLORIDE, CALCIUM CHLORIDE 600; 310; 30; 20 MG/100ML; MG/100ML; MG/100ML; MG/100ML
9 INJECTION, SOLUTION INTRAVENOUS CONTINUOUS
Status: DISCONTINUED | OUTPATIENT
Start: 2022-09-23 | End: 2022-09-27 | Stop reason: HOSPADM

## 2022-09-23 RX ORDER — FENTANYL CITRATE 50 UG/ML
50 INJECTION, SOLUTION INTRAMUSCULAR; INTRAVENOUS
Status: DISCONTINUED | OUTPATIENT
Start: 2022-09-23 | End: 2022-09-27 | Stop reason: HOSPADM

## 2022-09-23 RX ORDER — NALOXONE HCL 0.4 MG/ML
0.4 VIAL (ML) INJECTION AS NEEDED
Status: DISCONTINUED | OUTPATIENT
Start: 2022-09-23 | End: 2022-09-27 | Stop reason: HOSPADM

## 2022-09-23 RX ORDER — OXYBUTYNIN CHLORIDE 5 MG/1
5 TABLET, EXTENDED RELEASE ORAL DAILY
Qty: 14 TABLET | Refills: 0 | Status: SHIPPED | OUTPATIENT
Start: 2022-09-23 | End: 2023-02-10

## 2022-09-23 RX ORDER — GABAPENTIN 300 MG/1
600 CAPSULE ORAL ONCE
Status: COMPLETED | OUTPATIENT
Start: 2022-09-23 | End: 2022-09-23

## 2022-09-23 RX ORDER — PROMETHAZINE HYDROCHLORIDE 25 MG/1
25 SUPPOSITORY RECTAL ONCE AS NEEDED
Status: DISCONTINUED | OUTPATIENT
Start: 2022-09-23 | End: 2022-09-27 | Stop reason: HOSPADM

## 2022-09-23 RX ORDER — FAMOTIDINE 10 MG/ML
20 INJECTION, SOLUTION INTRAVENOUS ONCE
Status: DISCONTINUED | OUTPATIENT
Start: 2022-09-23 | End: 2022-09-23 | Stop reason: HOSPADM

## 2022-09-23 RX ORDER — LIDOCAINE HYDROCHLORIDE 10 MG/ML
INJECTION, SOLUTION EPIDURAL; INFILTRATION; INTRACAUDAL; PERINEURAL AS NEEDED
Status: DISCONTINUED | OUTPATIENT
Start: 2022-09-23 | End: 2022-09-23 | Stop reason: SURG

## 2022-09-23 RX ORDER — MIDAZOLAM HYDROCHLORIDE 1 MG/ML
0.5 INJECTION INTRAMUSCULAR; INTRAVENOUS
Status: DISCONTINUED | OUTPATIENT
Start: 2022-09-23 | End: 2022-09-23 | Stop reason: HOSPADM

## 2022-09-23 RX ORDER — GENTAMICIN SULFATE 40 MG/ML
INJECTION, SOLUTION INTRAMUSCULAR; INTRAVENOUS AS NEEDED
Status: DISCONTINUED | OUTPATIENT
Start: 2022-09-23 | End: 2022-09-23 | Stop reason: SURG

## 2022-09-23 RX ORDER — LIDOCAINE HYDROCHLORIDE 10 MG/ML
0.5 INJECTION, SOLUTION EPIDURAL; INFILTRATION; INTRACAUDAL; PERINEURAL ONCE AS NEEDED
Status: DISCONTINUED | OUTPATIENT
Start: 2022-09-23 | End: 2022-09-23 | Stop reason: HOSPADM

## 2022-09-23 RX ADMIN — EPHEDRINE SULFATE 10 MG: 50 INJECTION INTRAVENOUS at 08:29

## 2022-09-23 RX ADMIN — LIDOCAINE HYDROCHLORIDE 50 MG: 10 INJECTION, SOLUTION EPIDURAL; INFILTRATION; INTRACAUDAL; PERINEURAL at 08:15

## 2022-09-23 RX ADMIN — EPHEDRINE SULFATE 10 MG: 50 INJECTION INTRAVENOUS at 08:55

## 2022-09-23 RX ADMIN — SODIUM CHLORIDE, POTASSIUM CHLORIDE, SODIUM LACTATE AND CALCIUM CHLORIDE 9 ML/HR: 600; 310; 30; 20 INJECTION, SOLUTION INTRAVENOUS at 07:42

## 2022-09-23 RX ADMIN — ONDANSETRON 4 MG: 2 INJECTION INTRAMUSCULAR; INTRAVENOUS at 08:18

## 2022-09-23 RX ADMIN — PROPOFOL 200 MG: 10 INJECTION, EMULSION INTRAVENOUS at 08:15

## 2022-09-23 RX ADMIN — CEFAZOLIN SODIUM 2 G: 2 INJECTION, SOLUTION INTRAVENOUS at 08:15

## 2022-09-23 RX ADMIN — DEXAMETHASONE SODIUM PHOSPHATE 8 MG: 4 INJECTION, SOLUTION INTRA-ARTICULAR; INTRALESIONAL; INTRAMUSCULAR; INTRAVENOUS; SOFT TISSUE at 08:18

## 2022-09-23 RX ADMIN — EPHEDRINE SULFATE 10 MG: 50 INJECTION INTRAVENOUS at 08:44

## 2022-09-23 RX ADMIN — FAMOTIDINE 20 MG: 20 TABLET ORAL at 07:42

## 2022-09-23 RX ADMIN — FENTANYL CITRATE 100 MCG: 50 INJECTION, SOLUTION INTRAMUSCULAR; INTRAVENOUS at 08:15

## 2022-09-23 RX ADMIN — GABAPENTIN 600 MG: 300 CAPSULE ORAL at 07:42

## 2022-09-23 RX ADMIN — GENTAMICIN SULFATE 240 MG: 40 INJECTION, SOLUTION INTRAMUSCULAR; INTRAVENOUS at 08:38

## 2022-09-23 RX ADMIN — ACETAMINOPHEN 1000 MG: 500 TABLET, FILM COATED ORAL at 07:42

## 2022-09-23 RX ADMIN — LIDOCAINE HYDROCHLORIDE 0.5 ML: 10 INJECTION, SOLUTION EPIDURAL; INFILTRATION; INTRACAUDAL; PERINEURAL at 07:42

## 2022-09-23 NOTE — ANESTHESIA PROCEDURE NOTES
Airway  Urgency: elective    Date/Time: 9/23/2022 8:16 AM  Airway not difficult    General Information and Staff    Patient location during procedure: OR  CRNA/CAA: Glenys Candelaria CRNA    Indications and Patient Condition  Indications for airway management: airway protection    Preoxygenated: yes  Mask difficulty assessment: 1 - vent by mask    Final Airway Details  Final airway type: supraglottic airway      Successful airway: I-gel  Size 4    Number of attempts at approach: 1  Assessment: lips, teeth, and gum same as pre-op    Additional Comments  LMA placed without difficulty, ventilation with assist, equal breath sounds and symmetric chest rise and fall

## 2022-09-23 NOTE — INTERVAL H&P NOTE
"  Pre-Op H&P (See Recent Office Note Attached for Full H&P)    History and physical note from office reviewed and updated with the following, otherwise there are no changes in H&P:      Review of Systems:  General ROS:  no fever, chills, rashes.  No change since last office visit.  No recent sick exposure  Cardiovascular ROS: no chest pain or dyspnea on exertion  Respiratory ROS: no cough, shortness of breath, or wheezing    Immunization History:   Influenza:  no   Pneumococcal:  no   Tetanus:  yes     Meds:    No current facility-administered medications on file prior to encounter.     Current Outpatient Medications on File Prior to Encounter   Medication Sig Dispense Refill    ALPRAZolam (XANAX) 0.25 MG tablet Take 0.25 mg by mouth 3 (Three) Times a Day As Needed. Has not taken \"in a long time\"      Azelastine HCl 137 MCG/SPRAY solution 1 spray into the nostril(s) as directed by provider Daily As Needed.      meloxicam (MOBIC) 7.5 MG tablet Take 7.5 mg by mouth Daily.      simvastatin (ZOCOR) 10 MG tablet Take 10 mg by mouth Daily.      tretinoin (RETIN-A) 0.025 % cream Apply 1 application topically to the appropriate area as directed Every Night.         Vital Signs:  OJ=351/85     HR=69     SpO2=96% room air     Temp=98.4    Physical Exam:    CV:  S1S2 regular rate and rhythm, no murmur               Resp:  Clear to auscultation; respirations regular, even and unlabored    Results Review:     Lab Results   Component Value Date    WBC 7.10 09/20/2022    HGB 14.5 09/20/2022    HCT 44.7 09/20/2022    MCV 90.7 09/20/2022     09/20/2022    NEUTROABS 4.41 10/12/2021    BUN 12 07/21/2022    CREATININE 0.70 08/24/2022    EGFRIFNONA >60 07/21/2022    EGFRIFAFRI >60 07/21/2022     07/21/2022    K 4.1 07/21/2022     07/21/2022    CO2 24 07/21/2022    CALCIUM 9.3 07/21/2022    ALBUMIN 4.4 07/21/2022    AST 15 07/21/2022    ALT 16 07/21/2022    BILITOT 0.4 07/21/2022   I reviewed the patient's new clinical " results.    Cancer Staging (if applicable)  Cancer Patient: __ yes __no __unknown; If yes, clinical stage T:__ N:__M:__, stage group or __N/A    Assessment/Plan:   Nephrolithiasis; Gross hematuria  /    URETEROSCOPY LASER LITHOTRIPSY WITH STENT INSERTION      ROSE Key   9/23/2022   07:03 EDT

## 2022-09-23 NOTE — ANESTHESIA PREPROCEDURE EVALUATION
Anesthesia Evaluation     Patient summary reviewed and Nursing notes reviewed   no history of anesthetic complications:  NPO Solid Status: > 8 hours  NPO Liquid Status: > 2 hours           Airway   Mallampati: I  TM distance: >3 FB  Neck ROM: full  No difficulty expected  Dental - normal exam     Pulmonary - normal exam   (-) not a smoker  Cardiovascular - normal exam    ECG reviewed    (+) valvular problems/murmurs murmur, hyperlipidemia,       Neuro/Psych  (-) seizures, CVA  GI/Hepatic/Renal/Endo    (+) obesity,   renal disease stones,     Musculoskeletal     Abdominal    Substance History      OB/GYN          Other                        Anesthesia Plan    ASA 2     general     intravenous induction     Anesthetic plan, risks, benefits, and alternatives have been provided, discussed and informed consent has been obtained with: patient.    Plan discussed with CRNA.        CODE STATUS:

## 2022-09-23 NOTE — OP NOTE
URETEROSCOPY LASER LITHOTRIPSY WITH STENT INSERTION  Procedure Report    Patient Name:  Juan Marcial  YOB: 1955    Date of Surgery:  9/23/2022     Indications: 67-year-old female presenting for right ureteroscopy and laser lithotripsy based upon known nonobstructing 1 cm right renal pelvis stone.  The risk benefits and alternatives to ureteroscopy and laser lithotripsy were discussed with the patient she elected proceed    Pre-op Diagnosis:   Nephrolithiasis [N20.0]       Post-Op Diagnosis Codes:     * Nephrolithiasis [N20.0]          Procedure(s):  CYSTOURETHROSCOPY  RIGHT URETEROSCOPY LASER LITHOTRIPSY   RIGHT RETROGRADE PYELOGRAM  RIGHT URETERAL STENT PLACEMENT    Staff:  Surgeon(s):  Stan Nettles MD         Anesthesia: General    Estimated Blood Loss: none    Implants:    Implant Name Type Inv. Item Serial No.  Lot No. LRB No. Used Action   STNT PERCUFLX NO GW 4.8X24 - [id number] Stent STNT PERCUFLX NO GW 4.8X24  [employer] [id number] Right 1 Implanted       Specimen:          None        Findings:   1.  Normal urinary bladder without evidence of tumor stone or foreign body on diagnostic cystoscopy  2.  Right ureteroscopy with clearance of distal mid and proximal ureter.  3.  Right pyeloscopy with identification of approximately 1 cm renal pelvis stone.  Laser lithotripsy performed with dusting of stone.  4.  Right retrograde pyelogram with mildly dilated right renal collecting system noted  5.  Successful placement of 4.8 Maltese by 24 cm double-J ureteral stent without string    Complications: None immediate    Description of Procedure:     After informed consent, the patient was brought back to the operating suite and moved over to the operating table. General anesthesia was smoothly induced, IV antibiotics were administered, and the patient was placed in the dorsal lithotomy position with careful attention focused on padding all pressure points. The patient was  prepped and draped in standard fashion. A timeout was performed to ensure the correct patient and procedure    A 22Fr cystoscope was used to cannulate the urethra. The urethra was of normal course and caliber.  Upon entering the bladder, pan-cystoscopy revealed no bladder abnormalities. There were no stones, no diverticuli, and no trabeculations. The bilateral ureteral orifices were visualized in their orthotopic positions. Attention was then turned to the right ureteral orifice which was cannulated with a sensor wire. This was advanced into the right kidney under fluoroscopic guidance. The bladder was drained and the cystoscope was removed. The wire was secured as a safety wire.    SEMIRIGID URETEROSCOPY  The semi-rigid ureteroscope was then inserted back into the bladder. The right ureter was cannulated. The ureteroscope was advanced into the right ureter. There was no stone seen. Through the semi-rigid scope, a second sensor wire was placed      NO ACCESS SHEATH  We then switched to pressure-bag irrigation and a flexible ureteroscope was advanced over the second guidewire into the renal pelvis under live fluoroscopy and the second guidewire was removed. Pan-pyeloscopy was then performed which revealed approximately 1 cm renal pelvis stone. A 200 micron laser fiber was inserted through the scope.. We then completely dusted stone. Pan pyeloscopy was then performed which confirmed no significant stone fragments, only remaining stone dust.  The ureter was inspected as the flexible ureteroscope was removed and found to be free of any injuries or stone.      CYSTO PLACEMENT  We switched back to the rigid cystoscope which was reinserted over the existing guidewire. A 4.8 F x 24 cm double J ureteral stent was advanced up the right ureter under direct visualization which confirmed good curl in the bladder. Fluoroscopy confirmed good curl in the kidney. The bladder was drained and this concluded our procedure.      The  patient was brought back to the PACU in stable condition. All scopes and instruments were in good working order at the end of the case. There were no complications.      Disposition:  - The patient is considered stable for discharge.  Will be discharged with ureteral stents in place.  - Scripts at discharge included: Flomax, Pyridium, oxybutynin  Follow up: Patient will follow-up in 5 to 7 days for office-based stent removal.  She will be contacted by office staff for appointment date and time.  She was instructed that ureteral stents are now permanent and must follow-up for removal.          Stan Nettles MD     Date: 9/23/2022  Time: 09:25 EDT

## 2022-09-23 NOTE — ANESTHESIA POSTPROCEDURE EVALUATION
Patient: Juan Marcial    Procedure Summary     Date: 09/23/22 Room / Location:  [facility]    Anesthesia Start: 0810 Anesthesia Stop: 0919    Procedure: URETEROSCOPY LASER LITHOTRIPSY WITH STENT INSERTION (Right ) Diagnosis:       Nephrolithiasis      (Nephrolithiasis [N20.0])    Surgeons: Stan Nettles MD Provider: Bolivar Hall Jr., MD    Anesthesia Type: general ASA Status: 2          Anesthesia Type: general    Vitals  Vitals Value Taken Time   /71 09/23/22 0919   Temp 97 °F (36.1 °C) 09/23/22 0919   Pulse 101 09/23/22 0919   Resp 14 09/23/22 0919   SpO2 94 % 09/23/22 0919           Post Anesthesia Care and Evaluation    Patient location during evaluation: PACU  Patient participation: complete - patient participated  Level of consciousness: awake  Pain management: adequate    Airway patency: patent  Anesthetic complications: No anesthetic complications  PONV Status: none  Cardiovascular status: hemodynamically stable and acceptable  Respiratory status: nonlabored ventilation, acceptable and nasal cannula  Hydration status: acceptable

## 2022-09-23 NOTE — DISCHARGE INSTRUCTIONS
Ureteroscopy + Laser Lithotripsy Post-Operative Care/Expectations    Follow these guidelines after your procedure in order to assist with your recovery.    Anesthesia Precautions and Expectations  - Rest for 24 hours after receiving general anesthesia, make sure you have someone at home with you that can monitor you  - Do not operate a vehicle, drink alcohol, or make 'important decisions'/sign legal documentation during the immediate recovery period if you received sedation for your procedure  - You may experience a sore throat, jaw discomfort, or muscle aches related to anesthesia, these symptoms may last a few days    Activity  - You may resume your normal home activities immediately post-operatively; however, light activity is encouraged for 24 hours to prevent urinary bleeding  - Do not operate a vehicle or drink alcohol if you were prescribed narcotic pain medications     Bathing/Showering  - You may resume normal bathing and showering post-procedure    Pain/Urinary Symptoms  - You may experience burning urinary pain for a few days, and/or increased urinary urgency/frequency post-procedure for a few weeks which is expected (sometimes longer if a ureteral stent was left in place)   - A medication to prevent burning urinary pain (Phenazopyridine) may be prescribed by your doctor, take as directed  - A medication to prevent urinary urgency/frequency (sometimes referred to as “bladder spasms”) (Hyoscyamine, Oxybutynin, Mirabegron, Solifenacin, etc.) may be prescribed by your doctor for up to 1 month, take as directed     Urinary Bleeding (Hematuria)   - Some degree of light urinary bleeding (hematuria) is expected for up to 1-2 weeks (this may be as light as pink lemonade or somewhat darker like clear/pale red Gatorade); a good rule of thumb is that your urine should remain see-through    - If you experience heavy urinary bleeding (like the color and consistency of tomato juice, or red wine), large blood clots, or  you are unable to urinate for more than 8 hours you should contact your doctor and present to the nearest Emergency Department  - Drink plenty of water at home and stay hydrated, as this will help naturally flush out your bladder and urethra    Antibiotics  - Complete the antibiotic course (if) prescribed as directed to prevent urinary infection     When to call your doctor:   - Pain that is not controlled with oral medications  - Signs of significant infection: Fever 101F, shaking chills, profuse sweating, persistent nausea or vomiting, unable to tolerate food or drink   - Severe urinary bleeding or large blood clots in urine  - Inability to urinate for more than 8 hours post-surgery         STENT REMOVAL INSTRUCTIONS    A ureteral stent was left in place after your procedure, the ureteral stent is a flexible plastic tube roughly 9 to 10 inches in length which allows urine to drain from the kidney to the bladder while the inflammation in the ureter is settling down after surgery.  Without the stent in place, the ureter could be blocked due to this ureteral inflammation which could result in severe flank pain.    You will follow-up in approximately 5 days for stent removal in office.  You will be contacted by urology clinic staff to schedule appointment.  Stents are not permanent and you must follow-up for removal.

## 2022-09-29 ENCOUNTER — PROCEDURE VISIT (OUTPATIENT)
Dept: UROLOGY | Facility: CLINIC | Age: 67
End: 2022-09-29

## 2022-09-29 ENCOUNTER — TELEPHONE (OUTPATIENT)
Dept: UROLOGY | Facility: CLINIC | Age: 67
End: 2022-09-29

## 2022-09-29 VITALS — BODY MASS INDEX: 33.31 KG/M2 | WEIGHT: 188 LBS | HEIGHT: 63 IN

## 2022-09-29 DIAGNOSIS — R10.9 FLANK PAIN: Primary | ICD-10-CM

## 2022-09-29 DIAGNOSIS — N13.30 HYDRONEPHROSIS, UNSPECIFIED HYDRONEPHROSIS TYPE: Primary | ICD-10-CM

## 2022-09-29 PROCEDURE — 52310 CYSTOSCOPY AND TREATMENT: CPT | Performed by: UROLOGY

## 2022-09-29 RX ORDER — OXYCODONE HYDROCHLORIDE 5 MG/1
5 TABLET ORAL EVERY 6 HOURS PRN
Qty: 12 TABLET | Refills: 0 | Status: SHIPPED | OUTPATIENT
Start: 2022-09-29 | End: 2022-10-02

## 2022-09-29 NOTE — TELEPHONE ENCOUNTER
Patient had stent removed today, pt states she has sever pain.      Pain rated at a 8, pt states she is not able to walk and can hardly breath.  Recommended for pt to go to the ER and informed I would give update to Dr. Nettles.

## 2022-09-29 NOTE — PROGRESS NOTES
Procedure: Flexible Cystoscopy with Stent Removal     Preoperative Diagnosis: Right renal stone    Postoperative Diagnosis: Right renal stone    Procedure performed: Cystoscopy with right ureteral stent removal     Surgeon: Stan Nettles MD    Anesthesia: 2% Lidocaine Jelly    Indications: Juan Marcial is a 67 y.o. year old female with a history of right renal stone who underwent definitive stone treatment. A ureteral stent was left in place. The patient returns for planned ureteral stent removal today.     Procedure: Patient was taken to the urology procedure suite and prepped and draped in sterile fashion. A pre-procedural identification was performed. 10 cc of 2% lidocaine jelly was injected into the urethra. After adequate anesthesia, a lubricated flexible cystoscope was inserted into the urethra. The urethra appeared normal. The urinary sphincter was intact. The bladder was entered and 360 degree panendoscopy was performed revealing normal bladder anatomy, bilateral orthotopic ureteral orifices, and no concern for bladder stones, trabeculations, mucosal lesions/tumors, or divetrticuli. The right ureteral stent was in good position emanating from the ureteral orifice.      The right ureteral stent was grasped with a pair of grasping forceps and was removed without difficulty. The stent was removed intact.     PLAN    1) Discharge home    2) Follow up: 4 weeks with renal ultrasound

## 2022-10-25 ENCOUNTER — HOSPITAL ENCOUNTER (OUTPATIENT)
Dept: ULTRASOUND IMAGING | Facility: HOSPITAL | Age: 67
Discharge: HOME | End: 2022-10-25
Admitting: UROLOGY

## 2022-10-25 DIAGNOSIS — N13.30 HYDRONEPHROSIS, UNSPECIFIED HYDRONEPHROSIS TYPE: ICD-10-CM

## 2022-10-25 PROCEDURE — 76775 US EXAM ABDO BACK WALL LIM: CPT

## 2022-11-02 ENCOUNTER — OFFICE VISIT (OUTPATIENT)
Dept: UROLOGY | Facility: CLINIC | Age: 67
End: 2022-11-02

## 2022-11-02 VITALS — WEIGHT: 188 LBS | HEIGHT: 63 IN | BODY MASS INDEX: 33.31 KG/M2

## 2022-11-02 DIAGNOSIS — N20.0 NEPHROLITHIASIS: Primary | ICD-10-CM

## 2022-11-02 PROCEDURE — 99214 OFFICE O/P EST MOD 30 MIN: CPT | Performed by: UROLOGY

## 2022-11-02 NOTE — PROGRESS NOTES
Follow Up Office Visit      Patient Name: Juan Marcial  : 1955   MRN: [id number]     Chief Complaint: Nephrolithiasis    History of Present Illness: Juan Marcial is a 67 y.o. female who presents today for 4-week follow-up with renal ultrasound after ureteroscopy and laser lithotripsy for large renal pelvis stone.  He has undergone successful stent removal.  She reports improvement in symptoms, denies recurrence of flank pain or lower urinary tract symptoms.  Additionally, she has known left nonobstructing stone, plan for possible staged bilateral procedure.    Subjective      Review of System: Review of Systems   Constitutional: Negative for chills, fatigue, fever and unexpected weight change.   HENT: Negative for sore throat.    Eyes: Negative for visual disturbance.   Respiratory: Negative for cough, chest tightness and shortness of breath.    Cardiovascular: Negative for chest pain and leg swelling.   Gastrointestinal: Negative for blood in stool, constipation, diarrhea, nausea, rectal pain and vomiting.   Genitourinary: Negative for decreased urine volume, difficulty urinating, dysuria, enuresis, flank pain, frequency, genital sores, hematuria and urgency.   Musculoskeletal: Negative for back pain and joint swelling.   Skin: Negative for rash and wound.   Neurological: Negative for seizures, speech difficulty, weakness and headaches.   Psychiatric/Behavioral: Negative for confusion, sleep disturbance and suicidal ideas. The patient is not nervous/anxious.       I have reviewed the ROS documented by my clinical staff, updated as appropriate and I agree. Stan Nettles MD    I have reviewed and the following portions of the patient's history were updated as appropriate: past family history, past medical history, past social history, past surgical history and problem list.    Medications:     Current Outpatient Medications:   •  ALPRAZolam (XANAX) 0.25 MG tablet, Take 0.25 mg by mouth 3  "(Three) Times a Day As Needed. Has not taken \"in a long time\", Disp: , Rfl:   •  Azelastine HCl 137 MCG/SPRAY solution, 1 spray into the nostril(s) as directed by provider Daily As Needed., Disp: , Rfl:   •  meloxicam (MOBIC) 7.5 MG tablet, Take 7.5 mg by mouth Daily., Disp: , Rfl:   •  simvastatin (ZOCOR) 10 MG tablet, Take 10 mg by mouth Daily., Disp: , Rfl:   •  tretinoin (RETIN-A) 0.025 % cream, Apply 1 application topically to the appropriate area as directed Every Night., Disp: , Rfl:   •  oxybutynin XL (DITROPAN-XL) 5 MG 24 hr tablet, Take 1 tablet by mouth Daily. PRN BLADDER SPASM, Disp: 14 tablet, Rfl: 0  •  phenazopyridine (Pyridium) 200 MG tablet, Take 1 tablet by mouth 3 (Three) Times a Day As Needed for Bladder Spasms., Disp: 20 tablet, Rfl: 0    Allergies:   Allergies   Allergen Reactions   • Aleve [Naproxen] Unknown - Low Severity     Does not remember reaction, possibly an ingredient in Aleve   • Dust Mite Extract Other (See Comments)     Runny nose, itchy/watery eyes, congestion   • Lactose Intolerance (Gi) GI Intolerance     Dairy products   • Molds & Smuts Unknown (See Comments)     Runny nose, itchy/watery eyes, congestion   • Shellfish Allergy Rash     Last reaction in childhood, has tolerated IV contrast since   • Sulfa Antibiotics Rash   • Wheat Unknown (See Comments)     joint inflammation         Objective     Physical Exam:   Vital Signs:   Vitals:    11/02/22 1324   Weight: 85.3 kg (188 lb)   Height: 160 cm (62.99\")   PainSc: 0-No pain     Body mass index is 33.31 kg/m².     Physical Exam  Vitals and nursing note reviewed.   Constitutional:       Appearance: Normal appearance.   HENT:      Head: Normocephalic and atraumatic.   Cardiovascular:      Comments: Well perfused  Pulmonary:      Effort: Pulmonary effort is normal.   Abdominal:      General: Abdomen is flat.      Palpations: Abdomen is soft.   Musculoskeletal:         General: Normal range of motion.   Skin:     General: Skin is " warm and dry.   Neurological:      General: No focal deficit present.      Mental Status: She is alert and oriented to person, place, and time. Mental status is at baseline.   Psychiatric:         Mood and Affect: Mood normal.         Behavior: Behavior normal.         Thought Content: Thought content normal.         Judgment: Judgment normal.           Labs:   Brief Urine Lab Results  (Last result in the past 365 days)      Color   Clarity   Blood   Leuk Est   Nitrite   Protein   CREAT   Urine HCG        07/21/22 0843 Yellow   Clear     Trace                     Urine Culture    Urine Culture 9/20/22   Urine Culture No growth              Lab Results   Component Value Date    CALCIUM 9.3 07/21/2022     07/21/2022    K 4.1 07/21/2022    CO2 24 07/21/2022     07/21/2022    BUN 12 07/21/2022    CREATININE 0.70 08/24/2022    EGFRIFAFRI >60 07/21/2022    EGFRIFNONA >60 07/21/2022    BCR 15 07/21/2022    ANIONGAP 11 07/21/2022       Lab Results   Component Value Date    WBC 7.10 09/20/2022    HGB 14.5 09/20/2022    HCT 44.7 09/20/2022    MCV 90.7 09/20/2022     09/20/2022       Images:   CT Abdomen Pelvis With & Without Contrast    Result Date: 8/24/2022  1.  Nonobstructive stones are noted within the central aspect of the collecting system of the kidneys bilaterally measuring up to 8 mm. There is no evidence for associated obstructive uropathy. 2.  No evidence for abnormal renal enhancement or enhancing mass. Renal cysts are identified bilaterally. 3.  Evidence for rectal prolapse.  This report was finalized on 8/24/2022 4:25 PM by Kb De Souza MD.      US Renal Bilateral    Result Date: 10/25/2022  1.  No obstructive uropathy. Previously noted intrarenal calculi not identified. 2.  Left renal cyst 3.  Hepatic cyst which has increased in size.  This report was finalized on 10/25/2022 10:40 AM by Ankur Ball MD.             Measures:   Tobacco:   Juan Marcial  reports that she has never smoked.  She has never used smokeless tobacco.. I have educated her on the risk of diseases from using tobacco products.  Assessment / Plan      Assessment/Plan:   67 y.o. female who presents today for 4-week follow-up with renal ultrasound after ureteroscopy and laser lithotripsy for large renal pelvis stone.  He has undergone successful stent removal.  She reports improvement in symptoms, denies recurrence of flank pain or lower urinary tract symptoms.  Additionally, she has known left nonobstructing stone, plan for possible staged bilateral procedure.    Today we have reviewed her ultrasound which demonstrates resolution of hydronephrosis.  She is asymptomatic at this time.  Additionally, we have discussed management of her known nonobstructing left renal stone.  We have discussed the indication for left ureteroscopy, laser lithotripsy.  We have discussed the risks and benefits of this procedure.  She would like to schedule over the next 1 to 2 months pending scheduling availability.    Diagnoses and all orders for this visit:    1. Nephrolithiasis (Primary)  -     External Facility Surgical/Procedural Request; Future          I spent approximately 30 minutes providing clinical care for this patient; including review of patient's chart and provider documentation, face to face time spent with patient in examination room (obtaining history, performing physical exam, discussing diagnosis and management options), placing orders, and completing patient documentation.     Stan Nettles MD  [facility]

## 2022-12-15 ENCOUNTER — DOCUMENTATION (OUTPATIENT)
Dept: UROLOGY | Facility: CLINIC | Age: 67
End: 2022-12-15

## 2022-12-15 ENCOUNTER — OUTSIDE FACILITY SERVICE (OUTPATIENT)
Dept: UROLOGY | Facility: CLINIC | Age: 67
End: 2022-12-15

## 2022-12-15 DIAGNOSIS — N20.0 NEPHROLITHIASIS: Primary | ICD-10-CM

## 2022-12-15 PROCEDURE — 52351 CYSTOURETERO & OR PYELOSCOPE: CPT | Performed by: UROLOGY

## 2022-12-15 PROCEDURE — 52332 CYSTOSCOPY AND TREATMENT: CPT | Performed by: UROLOGY

## 2022-12-15 PROCEDURE — 74420 UROGRAPHY RTRGR +-KUB: CPT | Performed by: UROLOGY

## 2022-12-15 RX ORDER — TAMSULOSIN HYDROCHLORIDE 0.4 MG/1
1 CAPSULE ORAL DAILY
Qty: 14 CAPSULE | Refills: 0 | Status: SHIPPED | OUTPATIENT
Start: 2022-12-15 | End: 2022-12-29

## 2022-12-15 RX ORDER — CEFDINIR 300 MG/1
300 CAPSULE ORAL 2 TIMES DAILY
Qty: 14 CAPSULE | Refills: 0 | Status: SHIPPED | OUTPATIENT
Start: 2022-12-15 | End: 2022-12-22

## 2022-12-15 RX ORDER — PHENAZOPYRIDINE HYDROCHLORIDE 200 MG/1
200 TABLET, FILM COATED ORAL 3 TIMES DAILY PRN
Qty: 20 TABLET | Refills: 0 | Status: SHIPPED | OUTPATIENT
Start: 2022-12-15 | End: 2023-02-10

## 2022-12-21 ENCOUNTER — OUTSIDE FACILITY SERVICE (OUTPATIENT)
Dept: UROLOGY | Facility: CLINIC | Age: 67
End: 2022-12-21

## 2022-12-21 ENCOUNTER — DOCUMENTATION (OUTPATIENT)
Dept: UROLOGY | Facility: CLINIC | Age: 67
End: 2022-12-21

## 2022-12-21 DIAGNOSIS — N20.0 RENAL STONE: Primary | ICD-10-CM

## 2022-12-21 PROCEDURE — 52356 CYSTO/URETERO W/LITHOTRIPSY: CPT | Performed by: UROLOGY

## 2022-12-21 PROCEDURE — 74420 UROGRAPHY RTRGR +-KUB: CPT | Performed by: UROLOGY

## 2022-12-21 RX ORDER — NITROFURANTOIN 25; 75 MG/1; MG/1
100 CAPSULE ORAL 2 TIMES DAILY
Qty: 14 CAPSULE | Refills: 0 | Status: SHIPPED | OUTPATIENT
Start: 2022-12-21 | End: 2023-02-10

## 2022-12-21 RX ORDER — PHENAZOPYRIDINE HYDROCHLORIDE 200 MG/1
200 TABLET, FILM COATED ORAL 3 TIMES DAILY PRN
Qty: 20 TABLET | Refills: 0 | Status: SHIPPED | OUTPATIENT
Start: 2022-12-21 | End: 2023-02-10

## 2022-12-21 NOTE — PROGRESS NOTES
[facility]   [address] [place] [id number]      OPERATIVE REPORT     Patient Name:  Juan Marcial  YOB: 1955    Patient MRN: [id number]    Date of Surgery:       Indications: 67-year-old female with known left renal stone, has undergone prior cystoscopy and stent placement.  She presents today for definitive stone treatment.  There is benefits on turns were discussed with patient and she elects to proceed    Pre-op Diagnosis:   Left renal stone    Post-op Diagnosis:   Left renal stone    Procedure:  Cystoscopy  Left ureteroscopy  Left pyeloscopy, laser lithotripsy  Left retrograde pyelography < 1 HR  Left ureteral stent placement    Staff:  Stan Nettles MD    Anesthesia: General    Estimated Blood Loss: Minimal    Implants: 6 Urdu by 24 cm double-J ureteral stent without string    Specimen: None        Findings:   1.  Normal urinary bladder without evidence of tumor stone or foreign body.  Prior stent identified and removed intact  2.  Left ureteroscopy with clearance of distal mid and proximal ureter  3.  Left pyeloscopy with identification of large 8 to 9 mm left renal pelvis stone.  Laser lithotripsy performed with dusting of stone  4.  Left retrograde pyelogram with mild dilation of the left renal collecting system noted  5.  Successful placement of 6 Urdu by 24 cm double-J ureteral stent without string    Complications: None immediate    Description of Procedure:    After informed consent, the patient was brought back to the operating suite and moved over to the operating table. General anesthesia was smoothly induced, IV antibiotics were administered, and the patient was placed in the dorsal lithotomy position with careful attention focused on padding all pressure points. The patient was prepped and draped in standard fashion. A timeout was performed to ensure the correct patient and procedure    A 22Fr cystoscope was used to cannulate the urethra. The  urethra was of normal course and caliber.  Upon entering the bladder, pan-cystoscopy revealed no bladder abnormalities.  The bilateral ureteral orifices were visualized in their orthotopic positions. Attention was then turned to the left ureteral orifice which was cannulated with a sensor wire was advanced up the left ureter and into the collecting system on fluoroscopy to serve as a safety wire which was clamped to the surgical drapes.     SEMIRIGID URETEROSCOPY  The semi-rigid ureteroscope was then advanced into the bladder. The left ureter was cannulated demonstrating no evidence of stone.  A second sensor wire was advanced into the kidney through the scope and the scope was backed out.     ACCESS SHEATH  A 10/12F x 24cm ureteral access sheath was then advanced over the working wire under fluoroscopy. This was advanced up to the level of the proximal ureter without resistance . The working wire and inner obturator were then removed. A left flexible ureteroscope was advanced through the access sheath into the renal pelvis. Pan-pyeloscopy was then performed which revealed 8 to 9 mm stone in the renal pelvis. A 200 micron Thulium laser fiber was then advanced through the ureteroscope. The stones were fragmented into tiny stone dust particles. Pan pyeloscopy was then performed which confirmed no significant stone fragments larger than 1 mm. The ureter was then carefully inspected during removal of the access sheath under direct visualization and found to be free of any injuries or stone.    RETROGRADE PYELOGRAM  Contrast was instilled to the scope to perform retrograde pyelogram.  Mildly dilated renal collecting system noted.    CYSTO PLACEMENT  We switched back to the rigid cystoscope which was reinserted over the existing guidewire. A 6F x 24cm double J ureteral stent was advanced up the left ureter under direct visualization which confirmed good curl in the bladder. Fluoroscopy confirmed good curl in the kidney.  The bladder was drained and this concluded our procedure.        The patient was brought back to the PACU in stable condition. All scopes and instruments were in good working order at the end of the case. There were no complications.     Disposition/Follow Up: Patient be discharged a meeting appropriate PACU criteria.  The patient will follow-up in 5 to 7 days in office for office stent removal.  She be contacted by clinic for appointment date and time.    Stan Nettles MD     Date: 12/21/2022  Time: 11:53 EST

## 2022-12-29 ENCOUNTER — PROCEDURE VISIT (OUTPATIENT)
Dept: UROLOGY | Facility: CLINIC | Age: 67
End: 2022-12-29

## 2022-12-29 VITALS — BODY MASS INDEX: 33.31 KG/M2 | WEIGHT: 188 LBS | HEIGHT: 63 IN

## 2022-12-29 DIAGNOSIS — N20.0 NEPHROLITHIASIS: Primary | ICD-10-CM

## 2022-12-29 PROCEDURE — 52310 CYSTOSCOPY AND TREATMENT: CPT | Performed by: UROLOGY

## 2022-12-29 NOTE — PROGRESS NOTES
Procedure: Flexible Cystoscopy with Stent Removal     Preoperative Diagnosis: Left renal stone    Postoperative Diagnosis: Renal stone    Procedure performed: Cystoscopy with left ureteral stent removal     Surgeon: Stan Nettles MD    Anesthesia: 2% Lidocaine Jelly    Indications: Juan Marcial is a 67 y.o. year old female with a history of left renal stone who underwent definitive stone treatment. A ureteral stent was left in place. The patient returns for planned ureteral stent removal today.     Procedure: Patient was taken to the urology procedure suite and prepped and draped in sterile fashion. A pre-procedural identification was performed. 10 cc of 2% lidocaine jelly was injected into the urethra. After adequate anesthesia, a lubricated flexible cystoscope was inserted into the urethra. The urethra appeared normal. The urinary sphincter was intact. The bladder was entered and 360 degree panendoscopy was performed revealing normal bladder anatomy, bilateral orthotopic ureteral orifices, and no concern for bladder stones, trabeculations, mucosal lesions/tumors, or divetrticuli. The left ureter stent was in good position emanating from the ureteral orifice.      The left ureteral stent was grasped with a pair of grasping forceps and was removed without difficulty. The stent was removed intact.     PLAN    1) Discharge home    2) Follow up: 4 weeks ultrasound

## 2023-01-01 NOTE — PROGRESS NOTES
[facility]   [address] [place] [id number]      OPERATIVE REPORT     Patient Name:  Juan Marcial  YOB: 1955    Patient MRN: [id number]    Date of Surgery:       Indications: 67-year-old female with known 7 to 8 mm nonobstructing left renal stone, renal pelvis stone.  She has previously undergone ureteroscopy and laser lithotripsy for nonobstructing right renal stone.  She presents today for left-sided stone treatment.  The risks and benefits as well as alternatives were discussed the patient elects to proceed    Pre-op Diagnosis:   Left renal stone    Post-op Diagnosis:   Left renal stone    Procedure:  Cystourethroscopy  Left retrograde pyelogram with interpretation of imaging less than 1 hour  Left ureteroscopy  Left ureteral stent placement    Staff:  Stan Nettles MD    Anesthesia: General    Estimated Blood Loss: Minimal        Description of Procedure:    After informed consent, the patient was brought back to the operating suite and moved over to the operating table. General anesthesia was smoothly induced, IV antibiotics were administered, and the patient was placed in the dorsal lithotomy position with careful attention focused on padding all pressure points. The patient was prepped and draped in standard fashion. A timeout was performed to ensure the correct patient and procedure    A 22Fr cystoscope was used to cannulate the urethra. The urethra was of normal course and caliber.  Upon entering the bladder, pan-cystoscopy revealed no bladder abnormalities.  The bilateral ureteral orifices were visualized in their orthotopic positions. Attention was then turned to the left ureteral orifice which was cannulated with a 5 Fr open ended ureteral catheter. A retrograde pyelogram was performed demonstrating normal course and caliber of the ureter, outlining the location of the renal pelvis and collecting system.    A sensor wire was advanced up the left ureter and  into the collecting system on fluoroscopy to serve as a safety wire which was clamped to the surgical drapes.     SEMIRIGID URETEROSCOPY  The semi-rigid ureteroscope was then advanced into the bladder. The left ureter was cannulated demonstrating narrowing in the distal ureter, this was passed to the level of the mid ureter and into the proximal ureter..  A second sensor wire was advanced into the kidney through the scope and the scope was backed out.     Flexible ureteroscope was attempted to be passed there was difficulty in traversing area of narrowing.  Flexible ureteroscope was removed.  A 10 dilator was passed over the wire.  This was successfully passed.  Flexible ureteroscope reinserted, continue to be unable to traverse the area of narrowing.  At this point it was felt safest to place ureteral stent for dilation      CYSTO PLACEMENT  We switched back to the rigid cystoscope which was reinserted over the existing guidewire. A 6F x 24cm double J ureteral stent was advanced up the left ureter under direct visualization which confirmed good curl in the bladder. Fluoroscopy confirmed good curl in the kidney. The bladder was drained and this concluded our procedure.      The patient was brought back to the PACU in stable condition. All scopes and instruments were in good working order at the end of the case. There were no complications.    Implants: 6 Slovak by 24 cm double-J left ureteral stent without string      Complications: None immediate    Disposition/Follow Up: The patient may discharge remaining appropriate PACU criteria.  She require follow-up for second stage procedure.  She will maintain ureteral stents in place to allow passive dilation of the ureter.  We will schedule procedure in 7 to 10 days for repeat ureteroscopy laser lithotripsy.  She is understanding agreeable plan of care.  Understanding that ureteral stent is not permanent and must follow-up for second stage procedure        Stan Nettles,  MD     Date: 12/15/2022  Time: 13:09 EST      2023

## 2023-01-16 ENCOUNTER — HOSPITAL ENCOUNTER (OUTPATIENT)
Dept: ULTRASOUND IMAGING | Facility: HOSPITAL | Age: 68
Discharge: HOME | End: 2023-01-16
Admitting: UROLOGY
Payer: MEDICARE

## 2023-01-16 DIAGNOSIS — N20.0 NEPHROLITHIASIS: ICD-10-CM

## 2023-01-16 PROCEDURE — 76775 US EXAM ABDO BACK WALL LIM: CPT

## 2023-01-31 ENCOUNTER — TELEPHONE (OUTPATIENT)
Dept: UROLOGY | Facility: CLINIC | Age: 68
End: 2023-01-31

## 2023-01-31 NOTE — TELEPHONE ENCOUNTER
Provider: DR LUTHER  Caller: Juan Marcial  Relationship to Patient: SELF     Phone Number: [phone]  Reason for Call: PT RESCHEDULED HER APPT TODAY TO 2-7-23 @3:30PM DUE TO THE WEATHER TODAY

## 2023-02-07 ENCOUNTER — OFFICE VISIT (OUTPATIENT)
Dept: UROLOGY | Facility: CLINIC | Age: 68
End: 2023-02-07
Payer: MEDICARE

## 2023-02-07 VITALS — WEIGHT: 190 LBS | BODY MASS INDEX: 33.66 KG/M2 | HEIGHT: 63 IN | HEART RATE: 75 BPM | OXYGEN SATURATION: 94 %

## 2023-02-07 DIAGNOSIS — N20.0 NEPHROLITHIASIS: Primary | ICD-10-CM

## 2023-02-07 PROCEDURE — 99214 OFFICE O/P EST MOD 30 MIN: CPT | Performed by: UROLOGY

## 2023-02-07 NOTE — PROGRESS NOTES
"     Follow Up Office Visit      Patient Name: Juan Marcial  : 1955   MRN: [id number]     Chief Complaint:    Chief Complaint   Patient presents with   • Follow up after imaging      History of Present Illness: Juan Marcial is a 67 y.o. female who presents today for 4-week follow-up after ureteroscopy with ureteral stent placement, successful office-based stent removal.  She denies recurrence of flank pain or lower urinary tract symptoms.  Presents today with ultrasound for evaluation.    She has had staged bilateral ureteroscopy for large nonobstructing renal stone burden.    Subjective      Review of System: Review of Systems   Genitourinary: Negative for decreased urine volume, difficulty urinating, dysuria, enuresis, flank pain, frequency, hematuria and urgency.      I have reviewed the ROS documented by my clinical staff, updated as appropriate and I agree. Stan Nettles MD    I have reviewed and the following portions of the patient's history were updated as appropriate: past family history, past medical history, past social history, past surgical history and problem list.    Medications:     Current Outpatient Medications:   •  ALPRAZolam (XANAX) 0.25 MG tablet, Take 0.25 mg by mouth 3 (Three) Times a Day As Needed. Has not taken \"in a long time\", Disp: , Rfl:   •  Azelastine HCl 137 MCG/SPRAY solution, 1 spray into the nostril(s) as directed by provider Daily As Needed., Disp: , Rfl:   •  hydrocortisone 2.5 % ointment, , Disp: , Rfl:   •  simvastatin (ZOCOR) 10 MG tablet, Take 10 mg by mouth Daily., Disp: , Rfl:   •  tretinoin (RETIN-A) 0.025 % cream, Apply 1 application topically to the appropriate area as directed Every Night., Disp: , Rfl:   •  meloxicam (MOBIC) 7.5 MG tablet, Take 7.5 mg by mouth Daily., Disp: , Rfl:   •  nitrofurantoin, macrocrystal-monohydrate, (Macrobid) 100 MG capsule, Take 1 capsule by mouth 2 (Two) Times a Day., Disp: 14 capsule, Rfl: 0  •  oxybutynin XL " "(DITROPAN-XL) 5 MG 24 hr tablet, Take 1 tablet by mouth Daily. PRN BLADDER SPASM, Disp: 14 tablet, Rfl: 0  •  phenazopyridine (Pyridium) 200 MG tablet, Take 1 tablet by mouth 3 (Three) Times a Day As Needed for Bladder Spasms., Disp: 20 tablet, Rfl: 0  •  phenazopyridine (Pyridium) 200 MG tablet, Take 1 tablet by mouth 3 (Three) Times a Day As Needed for Bladder Spasms., Disp: 20 tablet, Rfl: 0  •  phenazopyridine (Pyridium) 200 MG tablet, Take 1 tablet by mouth 3 (Three) Times a Day As Needed for Bladder Spasms., Disp: 20 tablet, Rfl: 0    Allergies:   Allergies   Allergen Reactions   • Aleve [Naproxen] Unknown - Low Severity     Does not remember reaction, possibly an ingredient in Aleve   • Dust Mite Extract Other (See Comments)     Runny nose, itchy/watery eyes, congestion   • Lactose Intolerance (Gi) GI Intolerance     Dairy products   • Molds & Smuts Unknown (See Comments)     Runny nose, itchy/watery eyes, congestion   • Shellfish Allergy Rash     Last reaction in childhood, has tolerated IV contrast since   • Sulfa Antibiotics Rash   • Wheat Unknown (See Comments)     joint inflammation       Objective     Physical Exam:   Vital Signs:   Vitals:    02/07/23 1532   Pulse: 75   SpO2: 94%   Weight: 86.2 kg (190 lb)   Height: 160 cm (62.99\")   PainSc: 0-No pain     Body mass index is 33.67 kg/m².     Physical Exam  Vitals and nursing note reviewed.   Constitutional:       Appearance: Normal appearance.   HENT:      Head: Normocephalic and atraumatic.   Cardiovascular:      Comments: Well perfused  Pulmonary:      Effort: Pulmonary effort is normal.   Abdominal:      General: Abdomen is flat.      Palpations: Abdomen is soft.   Musculoskeletal:         General: Normal range of motion.   Skin:     General: Skin is warm and dry.   Neurological:      General: No focal deficit present.      Mental Status: She is alert and oriented to person, place, and time. Mental status is at baseline.   Psychiatric:         Mood " and Affect: Mood normal.         Behavior: Behavior normal.         Thought Content: Thought content normal.         Judgment: Judgment normal.             Labs:   Brief Urine Lab Results  (Last result in the past 365 days)      Color   Clarity   Blood   Leuk Est   Nitrite   Protein   CREAT   Urine HCG        07/21/22 0843 Yellow   Clear     Trace                     Urine Culture    Urine Culture 9/20/22   Urine Culture No growth              Lab Results   Component Value Date    CALCIUM 9.3 07/21/2022     07/21/2022    K 4.1 07/21/2022    CO2 24 07/21/2022     07/21/2022    BUN 12 07/21/2022    CREATININE 0.70 08/24/2022    EGFRIFAFRI >60 07/21/2022    EGFRIFNONA >60 07/21/2022    BCR 15 07/21/2022    ANIONGAP 11 07/21/2022       Lab Results   Component Value Date    WBC 7.10 09/20/2022    HGB 14.5 09/20/2022    HCT 44.7 09/20/2022    MCV 90.7 09/20/2022     09/20/2022       Images:   US Renal Bilateral    Result Date: 1/16/2023  Impression: No acute process. No evidence of hydronephrosis. Electronically Signed: Jennifer Collazo  1/16/2023 4:38 PM EST  Workstation ID: [id number]    US Renal Bilateral    Result Date: 10/25/2022  1.  No obstructive uropathy. Previously noted intrarenal calculi not identified. 2.  Left renal cyst 3.  Hepatic cyst which has increased in size.  This report was finalized on 10/25/2022 10:40 AM by Ankur Ball MD.            Measures:   Tobacco:   Juan Marcial  reports that she has never smoked. She has never been exposed to tobacco smoke. She has never used smokeless tobacco.. I have educated her on the risk of diseases from using tobacco products.      Urine Incontinence: ( NOUI)  Patient reports that she is not currently experiencing any symptoms of urinary incontinence.     Assessment / Plan      Assessment/Plan:   67 y.o. female is seen today for 4-week follow-up after ureteroscopy and laser lithotripsy with stent placement, successful office stent removal.  She  presents today with renal ultrasound which demonstrates no evidence of bilateral hydronephrosis.  No appreciable nephrolithiasis.  She denies flank pain or current lower urinary tract symptoms.  Denies dysuria or hematuria.    Today we have reviewed strategies to reduce stone risk in the future including increasing fluid intake to greater than 2 to 2-1/2 L, urine output 2 similar parameter.  We have discussed decreasing sodium and animal protein within the diet, increasing citrate intake.  We have discussed consideration for metabolic stone work given her complicated stone/recurrent stone disease.  She would like to monitor at this time.  She will follow-up in 1 year for a symptom check and with renal ultrasound.    Diagnoses and all orders for this visit:    1. Nephrolithiasis (Primary)  -     US Renal Bilateral; Future       Follow Up:   Return in about 1 year (around 2/7/2024).     I spent approximately 30 minutes providing clinical care for this patient; including review of patient's chart and provider documentation, face to face time spent with patient in examination room (obtaining history, performing physical exam, discussing diagnosis and management options), placing orders, and completing patient documentation.     Stan Nettles MD  [facility]

## 2023-10-03 ENCOUNTER — OFFICE VISIT (OUTPATIENT)
Dept: ORTHOPEDIC SURGERY | Facility: CLINIC | Age: 68
End: 2023-10-03
Payer: MEDICARE

## 2023-10-03 VITALS
WEIGHT: 161 LBS | DIASTOLIC BLOOD PRESSURE: 80 MMHG | SYSTOLIC BLOOD PRESSURE: 120 MMHG | BODY MASS INDEX: 29.63 KG/M2 | HEIGHT: 62 IN

## 2023-10-03 DIAGNOSIS — M17.12 PRIMARY OSTEOARTHRITIS OF LEFT KNEE: Primary | ICD-10-CM

## 2023-10-03 DIAGNOSIS — M25.562 LEFT KNEE PAIN, UNSPECIFIED CHRONICITY: ICD-10-CM

## 2023-10-03 RX ORDER — MELOXICAM 7.5 MG/1
1 TABLET ORAL DAILY
COMMUNITY
Start: 2023-09-16

## 2023-10-03 RX ORDER — CETIRIZINE HYDROCHLORIDE 10 MG/1
10 TABLET ORAL AS NEEDED
COMMUNITY

## 2023-10-03 RX ORDER — VIT C/B6/B5/MAGNESIUM/HERB 173 50-5-6-5MG
CAPSULE ORAL
COMMUNITY

## 2023-10-03 NOTE — PROGRESS NOTES
Orthopaedic Clinic Note: Knee New Patient    Chief Complaint   Patient presents with    Left Knee - Pain        HPI    Juan Marcial is a 68 y.o. female who presents with left knee pain for 1.5 year(s). Onset twisting injury. Pain is localized to the medial joint line and anterior knee and is a 4/10 on the pain scale. Pain is described as aching and stabbing. Associated symptoms include pain and swelling. The pain is worse with walking, standing, and rising from seated position; resting, sitting, and ice make it better. Previous treatments have included: bracing, cane/walker, NSAIDS, physical therapy, weight loss, oral steroids, and steroid injection (last injection 03/2022) .. Although some transient relief was reported with these interventions, these conservative measures have failed and symptoms have persisted. The patient is limited in daily activities and has had a significant decrease in quality of life as a result. She denies fevers, chills, or constitutional symptoms.    I have reviewed the following portions of the patient's history:History of Present Illness    Past Medical History:   Diagnosis Date    Anesthesia complication     can be triggered by men 60s and older, gray hair due to PTSD    Arthritis of back 1989    Arthritis of neck 2004    Cancer     basal cell/skin    Claustrophobia     Heart murmur     Hyperlipidemia     Kidney stone     Knee swelling 2022    Post traumatic stress disorder     can be triggered by men 60s and older, gray hair due to PTSD    Scoliosis 1969    Tear of meniscus of knee 2022      Past Surgical History:   Procedure Laterality Date    BREAST SURGERY Right 1976    benign tumor removed    CATARACT EXTRACTION Bilateral 2017    EYE SURGERY Bilateral 2021    floaters removed    HAND SURGERY Right 2021    middle finger joint    MOHS SURGERY Right 2014    right side of nose, basal cell carcinoma    MOHS SURGERY Left 2016    left side of nose, basal cell carcinoma    RETINAL  "DETACHMENT SURGERY Left 2019    SHOULDER SURGERY Right 2003    removed calcified bone chip    SKIN CANCER EXCISION      multiple on face starting from age 26    URETEROSCOPY LASER LITHOTRIPSY WITH STENT INSERTION Right 09/23/2022    Procedure: URETEROSCOPY LASER LITHOTRIPSY WITH STENT INSERTION;  Surgeon: Stan Nettles MD;  Location: [facility];  Service: Urology;  Laterality: Right;      Family History   Problem Relation Age of Onset    Pulmonary fibrosis Father     Diabetes Mother     Cancer Sister     Nephrolithiasis Sister      Social History     Socioeconomic History    Marital status:    Tobacco Use    Smoking status: Never     Passive exposure: Never    Smokeless tobacco: Never   Vaping Use    Vaping Use: Never used   Substance and Sexual Activity    Alcohol use: Never    Drug use: Never    Sexual activity: Not Currently      Current Outpatient Medications on File Prior to Visit   Medication Sig Dispense Refill    Azelastine HCl 137 MCG/SPRAY solution 1 spray into the nostril(s) as directed by provider Daily As Needed.      cetirizine (zyrTEC) 10 MG tablet Take 1 tablet by mouth As Needed.      meloxicam (MOBIC) 7.5 MG tablet Take 1 tablet by mouth Daily.      Multiple Vitamin (MULTIVITAMINS PO) Take  by mouth.      Turmeric 500 MG capsule Take  by mouth.      [DISCONTINUED] ALPRAZolam (XANAX) 0.25 MG tablet Take 0.25 mg by mouth 3 (Three) Times a Day As Needed. Has not taken \"in a long time\"      [DISCONTINUED] hydrocortisone 2.5 % ointment       [DISCONTINUED] simvastatin (ZOCOR) 10 MG tablet Take 10 mg by mouth Daily.      [DISCONTINUED] tretinoin (RETIN-A) 0.025 % cream Apply 1 application topically to the appropriate area as directed Every Night.       No current facility-administered medications on file prior to visit.      Allergies   Allergen Reactions    Aleve [Naproxen] Unknown - Low Severity     Does not remember reaction, possibly an ingredient in Aleve    Dust Mite Extract Other (See " "Comments)     Runny nose, itchy/watery eyes, congestion    Lactose Intolerance (Gi) GI Intolerance     Dairy products    Molds & Smuts Unknown (See Comments)     Runny nose, itchy/watery eyes, congestion    Other Rash     Plastic tape    Shellfish Allergy Rash     Last reaction in childhood, has tolerated IV contrast since    Sulfa Antibiotics Rash    Wheat Unknown (See Comments)     joint inflammation        Review of Systems   Constitutional: Negative.    HENT: Negative.     Eyes: Negative.    Respiratory: Negative.     Cardiovascular: Negative.    Gastrointestinal: Negative.    Endocrine: Negative.    Genitourinary: Negative.    Musculoskeletal:  Positive for arthralgias.   Skin: Negative.    Allergic/Immunologic: Negative.    Neurological: Negative.    Hematological: Negative.    Psychiatric/Behavioral: Negative.        The patient's Review of Systems was personally reviewed and confirmed as accurate.    The following portions of the patient's history were reviewed and updated as appropriate: allergies, current medications, past family history, past medical history, past social history, past surgical history, and problem list.    Physical Exam  Blood pressure 120/80, height 158 cm (62.21\"), weight 73 kg (161 lb).    Body mass index is 29.25 kg/m².    GENERAL APPEARANCE: awake, alert & oriented x 3, in no acute distress and well developed, well nourished  PSYCH: normal affect  LUNGS:  breathing nonlabored  EYES: sclera anicteric  CARDIOVASCULAR: palpable dorsalis pedis, palpable posterior tibial bilaterally. Capillary refill less than 2 seconds  EXTREMITIES: no clubbing, cyanosis  GAIT:  Antalgic            Right Lower Extremity Exam:   ----------  Hip Exam  ----------  FLEXION CONTRACTURE: None  FLEXION: 110 degrees  INTERNAL ROTATION: 20 degrees at 90 degrees of flexion   EXTERNAL ROTATION: 40 degrees at 90 degrees of flexion    PAIN WITH HIP MOTION: no  ----------  Knee Exam  ----------  ALIGNMENT: severe " varus, correctable to neutral    RANGE OF MOTION:  Decreased (3 - 120 degrees) with no extensor lag  LIGAMENTOUS STABILITY:   stable to varus and valgus stress at terminal extension and 30 degrees; retensioning of the MCL is appreciated with valgus stress at 30 degrees consistent with medial compartment degeneration     STRENGTH:  4/5 knee flexion, extension. 5/5 ankle dorsiflexion and plantarflexion.     PAIN WITH PALPATION: denies tenderness to palpation about the knee  KNEE EFFUSION: no  PAIN WITH KNEE ROM: no  PATELLAR CREPITUS: yes, asymptomatic  SPECIAL EXAM FINDINGS:  none    REFLEXES:  PATELLAR 2+/4  ACHILLES 2+/4    CLONUS: no  STRAIGHT LEG TEST:   negative    SENSATION TO LIGHT TOUCH:  DEEP PERONEAL/SUPERFICIAL PERONEAL/SURAL/SAPHENOUS/TIBIAL:   intact    EDEMA:  no  ERYTHEMA:  no  WOUNDS/INCISIONS:  no      Left Lower Extremity Exam:   ----------  Hip Exam  ----------  FLEXION CONTRACTURE: None  FLEXION: 110 degrees  INTERNAL ROTATION: 20 degrees at 90 degrees of flexion   EXTERNAL ROTATION: 40 degrees at 90 degrees of flexion    PAIN WITH HIP MOTION: no  ----------  Knee Exam  ----------  ALIGNMENT: severe varus, correctable to neutral    RANGE OF MOTION:  Decreased (5 - 120 degrees) with no extensor lag  LIGAMENTOUS STABILITY:   stable to varus and valgus stress at terminal extension and 30 degrees; retensioning of the MCL is appreciated with valgus stress at 30 degrees consistent with medial compartment degeneration     STRENGTH:  4/5 knee flexion, extension. 5/5 ankle dorsiflexion and plantarflexion.     PAIN WITH PALPATION: global  KNEE EFFUSION: yes, mild effusion  PAIN WITH KNEE ROM: yes, primarily medial  PATELLAR CREPITUS: yes, painful and symptomatic  SPECIAL EXAM FINDINGS:  none    REFLEXES:  PATELLAR 2+/4  ACHILLES 2+/4    CLONUS: no  STRAIGHT LEG TEST:   negative    SENSATION TO LIGHT TOUCH:  DEEP PERONEAL/SUPERFICIAL PERONEAL/SURAL/SAPHENOUS/TIBIAL:   intact    EDEMA:  no  ERYTHEMA:   no  WOUNDS/INCISIONS:  no    ______________________________________________________________________  ______________________________________________________________________    RADIOGRAPHIC FINDINGS:   Indication: Left knee pain    Comparison: No prior xrays are available for comparison    Left knee(s) 4 views: moderate to severe tricompartmental arthritis with genu varum alignment with bone-on-bone articulation medial compartment, periarticular osteophytes visualized in all compartments      Assessment/Plan:   Diagnosis Plan   1. Primary osteoarthritis of left knee  - Large Joint Arthrocentesis: L knee    Hyaluronan (ORTHOVISC) injection 30 mg    Large Joint Arthrocentesis      2. Left knee pain, unspecified chronicity  XR Knee 4+ View Left        Patient has end-stage osteoarthritis of the left knee.  She has failed prescription anti-inflammatory as well as cortisone injection.  I discussed total knee arthroplasty and what it entails as well as the recovery process and anesthesia process.  Patient declines this at this time.  She wishes to pursue viscosupplementation series.  We will initiate the injections today and see her back next week for the second injection in the left knee.    Procedure Note:  I discussed with the patient the potential benefits of performing a therapeutic injection of the left knee as well as potential risks including but not limited to infection, swelling, pain, bleeding, bruising, nerve/vessel damage, pseudoseptic reaction, and worsening joint pain. After informed consent and verifying correct patient, procedure site, and type of procedure, the area was prepped with alcohol, ethyl chloride was used to numb the skin. Via the superolateral approach, the viscosupplementation syringe contents were injected into the left knee. The patient tolerated the procedure well. There were no complications. A sterile dressing was placed over the injection site.    Follow up 1 week.    aWde Cortes,  MD  10/03/23  11:30 EDT

## 2023-10-03 NOTE — PROGRESS NOTES
Procedure   - Large Joint Arthrocentesis: L knee on 10/3/2023 8:55 AM  Indications: pain  Details: 21 G needle, anterolateral approach  Medications: 30 mg Hyaluronan 30 MG/2ML  Outcome: tolerated well, no immediate complications  Procedure, treatment alternatives, risks and benefits explained, specific risks discussed. Consent was given by the patient. Immediately prior to procedure a time out was called to verify the correct patient, procedure, equipment, support staff and site/side marked as required. Patient was prepped and draped in the usual sterile fashion.

## 2023-10-12 ENCOUNTER — CLINICAL SUPPORT (OUTPATIENT)
Dept: ORTHOPEDIC SURGERY | Facility: CLINIC | Age: 68
End: 2023-10-12
Payer: MEDICARE

## 2023-10-12 DIAGNOSIS — M17.12 PRIMARY OSTEOARTHRITIS OF LEFT KNEE: Primary | ICD-10-CM

## 2023-10-12 NOTE — PROGRESS NOTES
Procedure   - Large Joint Arthrocentesis: L knee on 10/12/2023 7:32 AM  Indications: pain  Details: 21 G needle, anterolateral approach  Medications: 2 mL Hyaluronan 30 MG/2ML  Outcome: tolerated well, no immediate complications  Procedure, treatment alternatives, risks and benefits explained, specific risks discussed. Consent was given by the patient. Immediately prior to procedure a time out was called to verify the correct patient, procedure, equipment, support staff and site/side marked as required. Patient was prepped and draped in the usual sterile fashion.

## 2023-10-12 NOTE — PROGRESS NOTES
Patient returns for second viscosupplementation injection in the left knee.  Denies complications from the first injection.  Overall her pain has improved.    Procedure Note:  I discussed with the patient the potential benefits of performing a therapeutic injection of the left knee as well as potential risks including but not limited to infection, swelling, pain, bleeding, bruising, nerve/vessel damage, pseudoseptic reaction, and worsening joint pain. After informed consent and verifying correct patient, procedure site, and type of procedure, the area was prepped with alcohol, ethyl chloride was used to numb the skin. Via the superolateral approach, the viscosupplementation syringe contents were injected into the left knee. The patient tolerated the procedure well. There were no complications. A sterile dressing was placed over the injection site.    Follow up 1 week.

## 2023-10-19 ENCOUNTER — CLINICAL SUPPORT (OUTPATIENT)
Dept: ORTHOPEDIC SURGERY | Facility: CLINIC | Age: 68
End: 2023-10-19
Payer: MEDICARE

## 2023-10-19 DIAGNOSIS — M17.12 PRIMARY OSTEOARTHRITIS OF LEFT KNEE: Primary | ICD-10-CM

## 2023-10-19 NOTE — PROGRESS NOTES
Patient returns for third viscosupplementation injection in the left knee.  Denies complications from the prior 2 injections.  Overall her pain has improved.    Procedure Note:  I discussed with the patient the potential benefits of performing a therapeutic injection of the left knee as well as potential risks including but not limited to infection, swelling, pain, bleeding, bruising, nerve/vessel damage, pseudoseptic reaction, and worsening joint pain. After informed consent and verifying correct patient, procedure site, and type of procedure, the area was prepped with alcohol, ethyl chloride was used to numb the skin. Via the superolateral approach, the viscosupplementation syringe contents were injected into the left knee. The patient tolerated the procedure well. There were no complications. A sterile dressing was placed over the injection site.    Follow up as needed.

## 2023-10-19 NOTE — PROGRESS NOTES
Procedure   - Large Joint Arthrocentesis: L knee on 10/19/2023 7:29 AM  Indications: pain  Details: 21 G needle, anterolateral approach  Medications: 2 mL Hyaluronan 30 MG/2ML  Outcome: tolerated well, no immediate complications  Procedure, treatment alternatives, risks and benefits explained, specific risks discussed. Consent was given by the patient. Immediately prior to procedure a time out was called to verify the correct patient, procedure, equipment, support staff and site/side marked as required. Patient was prepped and draped in the usual sterile fashion.

## 2023-10-27 ENCOUNTER — TELEPHONE (OUTPATIENT)
Dept: ORTHOPEDIC SURGERY | Facility: CLINIC | Age: 68
End: 2023-10-27
Payer: MEDICARE

## 2023-10-27 DIAGNOSIS — M17.11 PRIMARY OSTEOARTHRITIS OF RIGHT KNEE: Primary | ICD-10-CM

## 2023-10-27 NOTE — TELEPHONE ENCOUNTER
PATIENT IS REQUESTING GEL INJECTIONS FOR HER RIGHT KNEE. SHE JUST RECEIVED SOME IN HER LEFT. IF THIS IS OK, MAY WE PLEASE HAVE AN ORDER?

## 2023-10-27 NOTE — TELEPHONE ENCOUNTER
Preauthorization placed for Visco supplementation series to right knee.  Once approved please have patient follow-up with Dr. Cortes.

## 2023-12-05 ENCOUNTER — CLINICAL SUPPORT (OUTPATIENT)
Dept: ORTHOPEDIC SURGERY | Facility: CLINIC | Age: 68
End: 2023-12-05
Payer: MEDICARE

## 2023-12-05 DIAGNOSIS — M17.11 PRIMARY OSTEOARTHRITIS OF RIGHT KNEE: ICD-10-CM

## 2023-12-05 PROCEDURE — 20610 DRAIN/INJ JOINT/BURSA W/O US: CPT | Performed by: ORTHOPAEDIC SURGERY

## 2023-12-05 NOTE — PROGRESS NOTES
Procedure   - Large Joint Arthrocentesis: R knee on 12/5/2023 8:05 AM  Indications: pain  Details: 21 G needle, superolateral approach  Medications: 30 mg Hyaluronan 30 MG/2ML  Outcome: tolerated well, no immediate complications  Procedure, treatment alternatives, risks and benefits explained, specific risks discussed. Consent was given by the patient. Immediately prior to procedure a time out was called to verify the correct patient, procedure, equipment, support staff and site/side marked as required. Patient was prepped and draped in the usual sterile fashion.

## 2023-12-05 NOTE — PROGRESS NOTES
Patient returns for initiation of right knee viscosupplementation injections.  Denies interval changes since her last visit.    Procedure Note:  I discussed with the patient the potential benefits of performing a therapeutic injection of the right knee as well as potential risks including but not limited to infection, swelling, pain, bleeding, bruising, nerve/vessel damage, pseudoseptic reaction, and worsening joint pain. After informed consent and verifying correct patient, procedure site, and type of procedure, the area was prepped with alcohol, ethyl chloride was used to numb the skin. Via the superolateral approach, the viscosupplementation syringe contents were injected into the right knee. The patient tolerated the procedure well. There were no complications. A sterile dressing was placed over the injection site.    Follow up 1 week.

## 2023-12-12 ENCOUNTER — CLINICAL SUPPORT (OUTPATIENT)
Dept: ORTHOPEDIC SURGERY | Facility: CLINIC | Age: 68
End: 2023-12-12
Payer: MEDICARE

## 2023-12-12 DIAGNOSIS — M17.11 PRIMARY OSTEOARTHRITIS OF RIGHT KNEE: Primary | ICD-10-CM

## 2023-12-12 PROCEDURE — 20610 DRAIN/INJ JOINT/BURSA W/O US: CPT | Performed by: ORTHOPAEDIC SURGERY

## 2023-12-12 NOTE — PROGRESS NOTES
Procedure   - Large Joint Arthrocentesis: R knee on 12/12/2023 8:28 AM  Indications: pain  Details: 21 G needle, superolateral approach  Medications: 30 mg Hyaluronan 30 MG/2ML  Outcome: tolerated well, no immediate complications  Procedure, treatment alternatives, risks and benefits explained, specific risks discussed. Consent was given by the patient. Immediately prior to procedure a time out was called to verify the correct patient, procedure, equipment, support staff and site/side marked as required. Patient was prepped and draped in the usual sterile fashion.

## 2023-12-19 ENCOUNTER — CLINICAL SUPPORT (OUTPATIENT)
Dept: ORTHOPEDIC SURGERY | Facility: CLINIC | Age: 68
End: 2023-12-19
Payer: MEDICARE

## 2023-12-19 DIAGNOSIS — M17.11 PRIMARY OSTEOARTHRITIS OF RIGHT KNEE: Primary | ICD-10-CM

## 2023-12-19 NOTE — PROGRESS NOTES
Patient returns for third viscosupplementation injection in the right knee.  Denies complications with the prior 2 injections.  Overall her pain has improved.    Procedure Note:  I discussed with the patient the potential benefits of performing a therapeutic injection of the right knee as well as potential risks including but not limited to infection, swelling, pain, bleeding, bruising, nerve/vessel damage, pseudoseptic reaction, and worsening joint pain. After informed consent and verifying correct patient, procedure site, and type of procedure, the area was prepped with alcohol, ethyl chloride was used to numb the skin. Via the superolateral approach, the viscosupplementation syringe contents were injected into the right knee. The patient tolerated the procedure well. There were no complications. A sterile dressing was placed over the injection site.    Follow up 3 months.

## 2023-12-19 NOTE — PROGRESS NOTES
Procedure   - Large Joint Arthrocentesis: R knee on 12/19/2023 8:35 AM  Indications: pain  Details: 21 G needle, superolateral approach  Medications: 30 mg Hyaluronan 30 MG/2ML  Outcome: tolerated well, no immediate complications  Procedure, treatment alternatives, risks and benefits explained, specific risks discussed. Consent was given by the patient. Immediately prior to procedure a time out was called to verify the correct patient, procedure, equipment, support staff and site/side marked as required. Patient was prepped and draped in the usual sterile fashion.

## 2024-02-07 ENCOUNTER — HOSPITAL ENCOUNTER (OUTPATIENT)
Dept: ULTRASOUND IMAGING | Facility: HOSPITAL | Age: 69
Discharge: HOME | End: 2024-02-07
Admitting: UROLOGY
Payer: MEDICARE

## 2024-02-07 DIAGNOSIS — N20.0 NEPHROLITHIASIS: ICD-10-CM

## 2024-02-07 PROCEDURE — 76775 US EXAM ABDO BACK WALL LIM: CPT

## 2024-02-14 ENCOUNTER — OFFICE VISIT (OUTPATIENT)
Dept: UROLOGY | Facility: CLINIC | Age: 69
End: 2024-02-14
Payer: MEDICARE

## 2024-02-14 VITALS — HEIGHT: 62 IN | WEIGHT: 160 LBS | OXYGEN SATURATION: 98 % | HEART RATE: 70 BPM | BODY MASS INDEX: 29.44 KG/M2

## 2024-02-14 DIAGNOSIS — N28.1 RENAL CYST: ICD-10-CM

## 2024-02-14 DIAGNOSIS — N20.0 NEPHROLITHIASIS: Primary | ICD-10-CM

## 2024-02-15 PROBLEM — N28.1 RENAL CYST: Status: ACTIVE | Noted: 2024-02-15

## 2024-03-04 ENCOUNTER — HOSPITAL ENCOUNTER (OUTPATIENT)
Dept: CT IMAGING | Facility: HOSPITAL | Age: 69
Discharge: HOME | End: 2024-03-04
Admitting: UROLOGY
Payer: MEDICARE

## 2024-03-04 DIAGNOSIS — N28.1 RENAL CYST: ICD-10-CM

## 2024-03-04 LAB — CREAT BLDA-MCNC: 0.9 MG/DL (ref 0.6–1.3)

## 2024-03-04 PROCEDURE — 74178 CT ABD&PLV WO CNTR FLWD CNTR: CPT

## 2024-03-04 PROCEDURE — 25510000001 IOPAMIDOL PER 1 ML: Performed by: UROLOGY

## 2024-03-04 PROCEDURE — 82565 ASSAY OF CREATININE: CPT

## 2024-03-04 RX ADMIN — IOPAMIDOL 90 ML: 755 INJECTION, SOLUTION INTRAVENOUS at 16:55

## 2024-03-06 ENCOUNTER — OFFICE VISIT (OUTPATIENT)
Dept: UROLOGY | Facility: CLINIC | Age: 69
End: 2024-03-06
Payer: MEDICARE

## 2024-03-06 DIAGNOSIS — N20.0 NEPHROLITHIASIS: Primary | ICD-10-CM

## 2024-03-06 DIAGNOSIS — N28.1 RENAL CYST: ICD-10-CM

## 2024-03-13 NOTE — PROGRESS NOTES
Follow Up Office Visit      Patient Name: Juan Marcial  : 1955   MRN: [id number]     Chief Complaint: Renal cyst      History of Present Illness: Juan Marcial is a 68 y.o. female who presents today for follow up with cross-sectional imaging of the abdomen and pelvis with and without contrast for further characterization of renal lesion, possible renal cyst.  She denies flank pain, current symptoms.  Denies dysuria or hematuria.    Is previously followed for nephrolithiasis.  No current stone related symptoms.    Subjective      Review of System: Review of Systems   Genitourinary:  Negative for decreased urine volume, difficulty urinating, dysuria, enuresis, flank pain, frequency, hematuria and urgency.      I have reviewed the ROS documented by my clinical staff, updated as appropriate and I agree. Stan Nettles MD    I have reviewed and the following portions of the patient's history were updated as appropriate: past family history, past medical history, past social history, past surgical history and problem list.    Medications:     Current Outpatient Medications:     Astepro 205.5 MCG/SPRAY solution nasal spray, INHALE 2 SPRAYS IN EACH NOSTRIL TWICE DAILY, Disp: , Rfl:     cetirizine (zyrTEC) 10 MG tablet, Take 1 tablet by mouth As Needed., Disp: , Rfl:     Diclofenac Sodium (VOLTAREN) 1 % gel gel, APPLY 2-4 GRAMS TOPICALLY TO THE AFFECTED AREA(S) 3-4 TIMES A DAY AS NEEDED, Disp: , Rfl:     meloxicam (MOBIC) 7.5 MG tablet, Take 1 tablet by mouth Daily., Disp: , Rfl:     Multiple Vitamin (MULTIVITAMINS PO), Take  by mouth., Disp: , Rfl:     mupirocin (BACTROBAN) 2 % ointment, PLEASE SEE ATTACHED FOR DETAILED DIRECTIONS, Disp: , Rfl:     Turmeric 500 MG capsule, Take  by mouth., Disp: , Rfl:     Allergies:   Allergies   Allergen Reactions    Aleve [Naproxen] Unknown - Low Severity     Does not remember reaction, possibly an ingredient in Aleve    Dust Mite Extract Other (See Comments)      Runny nose, itchy/watery eyes, congestion    Lactose Intolerance (Gi) GI Intolerance     Dairy products    Molds & Smuts Unknown (See Comments)     Runny nose, itchy/watery eyes, congestion    Other Rash     Plastic tape    Shellfish Allergy Rash     Last reaction in childhood, has tolerated IV contrast since    Sulfa Antibiotics Rash    Wheat Unknown (See Comments)     joint inflammation         Objective     Physical Exam:   Vital Signs: There were no vitals filed for this visit.  There is no height or weight on file to calculate BMI.     Physical Exam  Vitals and nursing note reviewed.   Constitutional:       Appearance: Normal appearance.   HENT:      Head: Normocephalic and atraumatic.   Cardiovascular:      Comments: Well perfused  Pulmonary:      Effort: Pulmonary effort is normal.   Abdominal:      General: Abdomen is flat.      Palpations: Abdomen is soft.   Musculoskeletal:         General: Normal range of motion.   Skin:     General: Skin is warm and dry.   Neurological:      General: No focal deficit present.      Mental Status: She is alert and oriented to person, place, and time. Mental status is at baseline.   Psychiatric:         Mood and Affect: Mood normal.         Behavior: Behavior normal.         Thought Content: Thought content normal.         Judgment: Judgment normal.             Labs:   Brief Urine Lab Results       None                 Lab Results   Component Value Date    CALCIUM 9.3 07/21/2022     07/21/2022    K 4.1 07/21/2022    CO2 24 07/21/2022     07/21/2022    BUN 12 07/21/2022    CREATININE 0.90 03/04/2024    EGFRIFAFRI >60 07/21/2022    EGFRIFNONA >60 07/21/2022    BCR 15 07/21/2022    ANIONGAP 11 07/21/2022       Lab Results   Component Value Date    WBC 6.57 02/06/2024    HGB 14.5 02/06/2024    HCT 45.9 (H) 02/06/2024    MCV 95 02/06/2024     (H) 02/06/2024       Images:   CT Abdomen Pelvis With & Without Contrast    Result Date: 3/5/2024  Impression: 1. Two  stable liver cysts 2. No interval change in size or appearance of 2 hypodense nonenhancing lesions in the lower pole left kidney compatible with cysts measuring 1.1 x 1.1 and the other mildly irregular but stable measuring 0.9 x 0.6 cm. The largest compatible with a simple  cyst. The smallest may represent a complex cyst but stable since 8/20/2022 3. Colon diverticulosis Electronically Signed: Darryl Rivers MD  3/5/2024 10:20 AM EST  Workstation ID: [id number]    US Breast Left Limited    Result Date: 2/14/2024  BI-RADS Code: BI-RADS 1, Negative. RECOMMENDATIONS: Left Breast Recommendations: Routine Screening Mammogram in 11 months Bilateral Recommendations: Routine Screening Mammogram in 11 months CRITICAL RESULT: No. COMMUNICATION: The results and recommendations were discussed with the patient and a printed lay language version of the imaging report was given to the patient at the time of the visit. The mammogram was read with the assistance of CAD and tomosynthesis. By electronically signing this report, I, the attending physician, attest that I have personally reviewed the images/data for the above examination(s) and agree with the final edited report. Drafted by Em Arreola MD on 2/14/2024 8:50 AM Final report signed by Delroy Way MD on 2/14/2024 10:20 AM    Mammo Diagnostic Digital Tomosynthesis Left With CAD    Result Date: 2/14/2024  BI-RADS Code: BI-RADS 1, Negative. RECOMMENDATIONS: Left Breast Recommendations: Routine Screening Mammogram in 11 months Bilateral Recommendations: Routine Screening Mammogram in 11 months CRITICAL RESULT: No. COMMUNICATION: The results and recommendations were discussed with the patient and a printed lay language version of the imaging report was given to the patient at the time of the visit. The mammogram was read with the assistance of CAD and tomosynthesis. By electronically signing this report, I, the attending physician, attest that I have personally reviewed  the images/data for the above examination(s) and agree with the final edited report. Drafted by Em Arreola MD on 2/14/2024 8:50 AM Final report signed by Delroy Way MD on 2/14/2024 10:20 AM    US Renal Bilateral    Result Date: 2/8/2024  Impression: 1. Previously noted right and left mid pole renal calculi seen on 8/24/2022 CT scan are not identified today. No visible nephrolithiasis. 2. Normal sized kidneys with no evidence of obstructive uropathy. 3. Somewhat limited evaluation of the left kidney due to limited acoustic window, but apparently with interval development of a 2.7 cm complex lesion of the left lower renal pole, with appearance of internal debris or irregular internal septations, and internal color Doppler flow, potentially renal mass. Consider evaluation with CT or MRI renal mass protocol for further characterization. Electronically Signed: Igor Aguirre MD  2/8/2024 1:26 PM EST  Workstation ID: [id number]    Mammo Screening Digital Tomosynthesis Bilateral With CAD    Result Date: 1/19/2024  Findings indicate additional imaging studies of the left breast are required for a complete evaluation. BI-RADS CATEGORY: Overall: 0 - Incomplete: Needs Additional Imaging Evaluation RECOMMENDATION:      - Additional Imaging Diagnostic Mammogram with Possible Ultrasound. Patient Lifetime Risk Score of Breast Malignancy: 4.0 % This risk assessment is calculated using the Tran Risk Assessment model which may underestimate the lifetime risk of breast malignancy. COMMUNICATION: Computer-aided detection (CAD) and tomosynthesis were utilized by the radiologist in the interpretation of this examination. The results and recommendations will be sent to the patient in a printed lay language version of the imaging report.        Measures:   Tobacco:   Juan Marcial  reports that she has never smoked. She has never been exposed to tobacco smoke. She has never used smokeless tobacco. I have educated her on the risk of  diseases from using tobacco products.          Urine Incontinence: ( NOUI)  Patient reports that she is not currently experiencing any symptoms of urinary incontinence.     Assessment / Plan      Assessment/Plan:   68 y.o. female is seen today for follow up with cross-sectional CT imaging with and without contrast for evaluation of renal cyst.  She has had known renal cyst on prior ultrasounds obtained for renal stone disease.  Most recent ultrasound revealed possible change in characteristic of renal lesion and therefore she presents today with cross-sectional imaging.  CT abdomen pelvis with and without contrast is demonstrated no change in 2 hypodense nonenhancing lesions of the left lower pole.  There are no enhancing characteristics, compatible with simple cyst.  Smaller cyst a complex cyst but stable in size since 2022.  At this time we have discussed the benign nature of this lesion, stable in characteristic.  We have discussed that we would not recommend further intervention.  We could consider surveillance, she would like to return in 1 year with renal ultrasound for continued surveillance.  She is understanding agreeable plan of care.    Diagnoses and all orders for this visit:    1. Nephrolithiasis (Primary)  -     US Renal Bilateral; Future    2. Renal cyst         Follow Up:   Return in about 1 year (around 3/6/2025) for Recheck.     I spent approximately 20 minutes providing clinical care for this patient; including review of patient's chart and provider documentation, face to face time spent with patient in examination room (obtaining history, performing physical exam, discussing diagnosis and management options), placing orders, and completing patient documentation.     Stan Nettles MD  [facility] [place]

## 2024-03-19 ENCOUNTER — OFFICE VISIT (OUTPATIENT)
Dept: ORTHOPEDIC SURGERY | Facility: CLINIC | Age: 69
End: 2024-03-19
Payer: MEDICARE

## 2024-03-19 VITALS
BODY MASS INDEX: 28.85 KG/M2 | DIASTOLIC BLOOD PRESSURE: 74 MMHG | WEIGHT: 156.8 LBS | HEIGHT: 62 IN | SYSTOLIC BLOOD PRESSURE: 124 MMHG

## 2024-03-19 DIAGNOSIS — M17.12 PRIMARY OSTEOARTHRITIS OF LEFT KNEE: ICD-10-CM

## 2024-03-19 DIAGNOSIS — M17.11 PRIMARY OSTEOARTHRITIS OF RIGHT KNEE: Primary | ICD-10-CM

## 2024-03-19 PROCEDURE — 1159F MED LIST DOCD IN RCRD: CPT | Performed by: ORTHOPAEDIC SURGERY

## 2024-03-19 PROCEDURE — 99213 OFFICE O/P EST LOW 20 MIN: CPT | Performed by: ORTHOPAEDIC SURGERY

## 2024-03-19 PROCEDURE — 1160F RVW MEDS BY RX/DR IN RCRD: CPT | Performed by: ORTHOPAEDIC SURGERY

## 2024-03-19 NOTE — PROGRESS NOTES
Orthopaedic Clinic Note: Knee Established Patient    Chief Complaint   Patient presents with    Follow-up     3 month follow up --Primary osteoarthritis of bilateral knee        HPI    It has been 3  month(s) since Ms. Marcial's last visit. She returns to clinic today for follow-up bilateral knee osteoarthritis.  She completed viscosupplementation injection in the right knee 3 months ago.  The right knee is continuing to do well.  Her left knee Visco series was completed 5 months ago.  She is starting to experience some minor discomfort in the knee that she rates a 1/10 on the pain scale today.  She is ambulating with no assistive device.  Denies fevers chills or constitutional symptoms.  Overall she is doing about the same.  Past Medical History:   Diagnosis Date    Anesthesia complication     can be triggered by men 60s and older, gray hair due to PTSD    Arthritis of back 1989    Arthritis of neck 2004    Cancer     basal cell/skin    Claustrophobia     Heart murmur     Hyperlipidemia     Kidney stone     Knee swelling 2022    Post traumatic stress disorder     can be triggered by men 60s and older, gray hair due to PTSD    Scoliosis 1969    Tear of meniscus of knee 2022      Past Surgical History:   Procedure Laterality Date    BREAST SURGERY Right 1976    benign tumor removed    CATARACT EXTRACTION Bilateral 2017    EYE SURGERY Bilateral 2021    floaters removed    HAND SURGERY Right 2021    middle finger joint    MOHS SURGERY Right 2014    right side of nose, basal cell carcinoma    MOHS SURGERY Left 2016    left side of nose, basal cell carcinoma    RETINAL DETACHMENT SURGERY Left 2019    SHOULDER SURGERY Right 2003    removed calcified bone chip    SKIN CANCER EXCISION      multiple on face starting from age 26    URETEROSCOPY LASER LITHOTRIPSY WITH STENT INSERTION Right 09/23/2022    Procedure: URETEROSCOPY LASER LITHOTRIPSY WITH STENT INSERTION;  Surgeon: Stan Nettles MD;  Location: [facility];  Service:  Urology;  Laterality: Right;      Family History   Problem Relation Age of Onset    Pulmonary fibrosis Father     Diabetes Mother     Cancer Sister     Nephrolithiasis Sister      Social History     Socioeconomic History    Marital status:    Tobacco Use    Smoking status: Never     Passive exposure: Never    Smokeless tobacco: Never   Vaping Use    Vaping status: Never Used   Substance and Sexual Activity    Alcohol use: Never    Drug use: Never    Sexual activity: Not Currently      Current Outpatient Medications on File Prior to Visit   Medication Sig Dispense Refill    Astepro 205.5 MCG/SPRAY solution nasal spray INHALE 2 SPRAYS IN EACH NOSTRIL TWICE DAILY      cetirizine (zyrTEC) 10 MG tablet Take 1 tablet by mouth As Needed.      Diclofenac Sodium (VOLTAREN) 1 % gel gel APPLY 2-4 GRAMS TOPICALLY TO THE AFFECTED AREA(S) 3-4 TIMES A DAY AS NEEDED      meloxicam (MOBIC) 7.5 MG tablet Take 1 tablet by mouth Daily.      Multiple Vitamin (MULTIVITAMINS PO) Take  by mouth.      mupirocin (BACTROBAN) 2 % ointment PLEASE SEE ATTACHED FOR DETAILED DIRECTIONS      Turmeric 500 MG capsule Take  by mouth.       No current facility-administered medications on file prior to visit.      Allergies   Allergen Reactions    Aleve [Naproxen] Unknown - Low Severity     Does not remember reaction, possibly an ingredient in Aleve    Dust Mite Extract Other (See Comments)     Runny nose, itchy/watery eyes, congestion    Lactose Intolerance (Gi) GI Intolerance     Dairy products    Molds & Smuts Unknown (See Comments)     Runny nose, itchy/watery eyes, congestion    Other Rash     Plastic tape    Shellfish Allergy Rash     Last reaction in childhood, has tolerated IV contrast since    Sulfa Antibiotics Rash    Wheat Unknown (See Comments)     joint inflammation        Review of Systems   Constitutional: Negative.    HENT: Negative.     Eyes: Negative.    Respiratory: Negative.     Cardiovascular: Negative.    Gastrointestinal:  "Negative.    Endocrine: Negative.    Genitourinary: Negative.    Musculoskeletal:  Positive for arthralgias.   Skin: Negative.    Allergic/Immunologic: Negative.    Neurological: Negative.    Hematological: Negative.    Psychiatric/Behavioral: Negative.          The patient's Review of Systems was personally reviewed and confirmed as accurate.    Physical Exam  Blood pressure 124/74, height 158 cm (62.21\"), weight 71.1 kg (156 lb 12.8 oz).    Body mass index is 28.49 kg/m².    GENERAL APPEARANCE: awake, alert, oriented, in no acute distress and well developed, well nourished  LUNGS:  breathing nonlabored  EXTREMITIES: no clubbing, cyanosis  PERIPHERAL PULSES: palpable dorsalis pedis and posterior tibial pulses bilaterally.    GAIT:  Normal        ----------  Bilateral Knee Exam:  ----------  ALIGNMENT: severe varus, correctable to neutral  ----------  RANGE OF MOTION:  Decreased (5 - 120 degrees) with no extensor lag  LIGAMENTOUS STABILITY:   stable to varus and valgus stress at terminal extension and 30 degrees; retensioning of the MCL is appreciated with valgus stress at 30 degrees consistent with medial compartment degeneration  ----------  STRENGTH:  KNEE FLEXION 4/5  KNEE EXTENSION  4/5  ANKLE DORSIFLEXION  5/5  ANKLE PLANTARFLEXION  5/5  ----------  PAIN WITH PALPATION:medial joint line and anterior knee of the left only  KNEE EFFUSION: yes, trace effusion  PAIN WITH KNEE ROM: no  PATELLAR CREPITUS:  yes, asymptomatic  ----------  SENSATION TO LIGHT TOUCH:  DEEP PERONEAL/SUPERFICIAL PERONEAL/SURAL/SAPHENOUS/TIBIAL:    intact  ----------  EDEMA:  no  ERYTHEMA:    no  WOUNDS/INCISIONS:   no  _____________________________________________________________________  _____________________________________________________________________    RADIOGRAPHIC FINDINGS:   Indication: Bilateral knee pain    Comparison: Todays xrays were compared to previous xrays from 10/3/2023    Knee films: moderate to severe tricompartmental " arthritis with genu varum alignment, periarticular osteophytes visualized in all compartments and No significant changes compared to prior radiographs.    Assessment/Plan:   Diagnosis Plan   1. Primary osteoarthritis of right knee  XR Knee 4+ View Bilateral      2. Primary osteoarthritis of left knee  Large Joint Arthrocentesis        Patient received excellent benefit from prior viscosupplementation series.  Currently her right knee is doing well.  She is wishing to pursue viscosupplementation series for the left knee.  We will obtain insurance preauthorization and then initiate the injections after insurance approval.  We will start her injection 6 months and a day after completion of the last injection series.      Wade Cortes MD  03/19/24  09:14 EDT

## 2024-04-26 ENCOUNTER — CLINICAL SUPPORT (OUTPATIENT)
Dept: ORTHOPEDIC SURGERY | Facility: CLINIC | Age: 69
End: 2024-04-26
Payer: MEDICARE

## 2024-04-26 DIAGNOSIS — M17.12 PRIMARY OSTEOARTHRITIS OF LEFT KNEE: Primary | ICD-10-CM

## 2024-04-26 NOTE — PROGRESS NOTES
Procedure   - Large Joint Arthrocentesis: L knee on 4/26/2024 7:23 AM  Indications: pain  Details: 21 G needle, superolateral approach  Medications: 30 mg Hyaluronan 30 MG/2ML  Outcome: tolerated well, no immediate complications  Procedure, treatment alternatives, risks and benefits explained, specific risks discussed. Consent was given by the patient. Immediately prior to procedure a time out was called to verify the correct patient, procedure, equipment, support staff and site/side marked as required. Patient was prepped and draped in the usual sterile fashion.

## 2024-04-26 NOTE — PROGRESS NOTES
Patient returns for initiation of left knee Orthovisc injections.  Denies interval changes since her last visit.    Procedure Note:  I discussed with the patient the potential benefits of performing a therapeutic injection of the left knee as well as potential risks including but not limited to infection, swelling, pain, bleeding, bruising, nerve/vessel damage, pseudoseptic reaction, and worsening joint pain. After informed consent and verifying correct patient, procedure site, and type of procedure, the area was prepped with alcohol, ethyl chloride was used to numb the skin. Via the superolateral approach, the viscosupplementation syringe contents were injected into the left knee. The patient tolerated the procedure well. There were no complications. A sterile dressing was placed over the injection site.    Follow up 1 week.

## 2024-05-03 ENCOUNTER — CLINICAL SUPPORT (OUTPATIENT)
Dept: ORTHOPEDIC SURGERY | Facility: CLINIC | Age: 69
End: 2024-05-03
Payer: MEDICARE

## 2024-05-03 DIAGNOSIS — M17.12 PRIMARY OSTEOARTHRITIS OF LEFT KNEE: Primary | ICD-10-CM

## 2024-05-03 RX ORDER — HYDROXYZINE HYDROCHLORIDE 25 MG/1
TABLET, FILM COATED ORAL
COMMUNITY
Start: 2024-05-02

## 2024-05-03 NOTE — PROGRESS NOTES
Patient returns for second Orthovisc injection in the left knee.  Denies complications with the first injection.  Overall her pain has improved.    Procedure Note:  I discussed with the patient the potential benefits of performing a therapeutic injection of the left knee as well as potential risks including but not limited to infection, swelling, pain, bleeding, bruising, nerve/vessel damage, pseudoseptic reaction, and worsening joint pain. After informed consent and verifying correct patient, procedure site, and type of procedure, the area was prepped with alcohol, ethyl chloride was used to numb the skin. Via the superolateral approach, the viscosupplementation syringe contents were injected into the [right/left] knee. The patient tolerated the procedure well. There were no complications. A sterile dressing was placed over the injection site.    Follow up 1 week.

## 2024-05-03 NOTE — PROGRESS NOTES
Procedure   - Large Joint Arthrocentesis: L knee on 5/3/2024 7:32 AM  Indications: pain  Details: 21 G needle, superolateral approach  Medications: 30 mg Hyaluronan 30 MG/2ML  Outcome: tolerated well, no immediate complications  Procedure, treatment alternatives, risks and benefits explained, specific risks discussed. Consent was given by the patient. Immediately prior to procedure a time out was called to verify the correct patient, procedure, equipment, support staff and site/side marked as required. Patient was prepped and draped in the usual sterile fashion.

## 2024-05-10 ENCOUNTER — CLINICAL SUPPORT (OUTPATIENT)
Dept: ORTHOPEDIC SURGERY | Facility: CLINIC | Age: 69
End: 2024-05-10
Payer: MEDICARE

## 2024-05-10 DIAGNOSIS — M17.12 PRIMARY OSTEOARTHRITIS OF LEFT KNEE: Primary | ICD-10-CM

## 2024-06-21 ENCOUNTER — CLINICAL SUPPORT (OUTPATIENT)
Dept: ORTHOPEDIC SURGERY | Facility: CLINIC | Age: 69
End: 2024-06-21
Payer: MEDICARE

## 2024-06-21 DIAGNOSIS — M17.11 PRIMARY OSTEOARTHRITIS OF RIGHT KNEE: Primary | ICD-10-CM

## 2024-06-21 RX ORDER — FLUOXETINE HYDROCHLORIDE 20 MG/1
CAPSULE ORAL
COMMUNITY
Start: 2024-06-07

## 2024-06-21 NOTE — PROGRESS NOTES
Procedure   - Large Joint Arthrocentesis: R knee on 6/21/2024 7:35 AM  Indications: pain  Details: 21 G needle, superolateral approach  Medications: 30 mg Hyaluronan 30 MG/2ML  Outcome: tolerated well, no immediate complications  Procedure, treatment alternatives, risks and benefits explained, specific risks discussed. Consent was given by the patient. Immediately prior to procedure a time out was called to verify the correct patient, procedure, equipment, support staff and site/side marked as required. Patient was prepped and draped in the usual sterile fashion.

## 2024-06-28 ENCOUNTER — CLINICAL SUPPORT (OUTPATIENT)
Dept: ORTHOPEDIC SURGERY | Facility: CLINIC | Age: 69
End: 2024-06-28
Payer: MEDICARE

## 2024-06-28 VITALS
SYSTOLIC BLOOD PRESSURE: 124 MMHG | WEIGHT: 156 LBS | BODY MASS INDEX: 28.71 KG/M2 | HEIGHT: 62 IN | DIASTOLIC BLOOD PRESSURE: 74 MMHG

## 2024-06-28 DIAGNOSIS — M17.11 PRIMARY OSTEOARTHRITIS OF RIGHT KNEE: Primary | ICD-10-CM

## 2024-06-28 NOTE — PROGRESS NOTES
Procedure   - Large Joint Arthrocentesis: R knee on 6/28/2024 7:29 AM  Indications: pain  Details: 21 G needle, superolateral approach  Medications: 30 mg Hyaluronan 30 MG/2ML  Outcome: tolerated well, no immediate complications  Procedure, treatment alternatives, risks and benefits explained, specific risks discussed. Consent was given by the patient. Immediately prior to procedure a time out was called to verify the correct patient, procedure, equipment, support staff and site/side marked as required. Patient was prepped and draped in the usual sterile fashion.

## 2024-07-05 ENCOUNTER — CLINICAL SUPPORT (OUTPATIENT)
Dept: ORTHOPEDIC SURGERY | Facility: CLINIC | Age: 69
End: 2024-07-05
Payer: MEDICARE

## 2024-07-05 DIAGNOSIS — M17.11 PRIMARY OSTEOARTHRITIS OF RIGHT KNEE: Primary | ICD-10-CM

## 2024-07-05 NOTE — PROGRESS NOTES
Patient returns for third Orthovisc injection in the right knee.  Denies complications with the prior to injections.  Overall her pain has improved.    Procedure Note:  I discussed with the patient the potential benefits of performing a therapeutic injection of the right knee as well as potential risks including but not limited to infection, swelling, pain, bleeding, bruising, nerve/vessel damage, pseudoseptic reaction, and worsening joint pain. After informed consent and verifying correct patient, procedure site, and type of procedure, the area was prepped with alcohol, ethyl chloride was used to numb the skin. Via the superolateral approach, the viscosupplementation syringe contents were injected into the right knee. The patient tolerated the procedure well. There were no complications. A sterile dressing was placed over the injection site.    Follow up 6 months.

## 2024-07-05 NOTE — PROGRESS NOTES
Procedure   - Large Joint Arthrocentesis: R knee on 7/5/2024 7:18 AM  Indications: pain  Details: 21 G needle, superolateral approach  Medications: 30 mg Hyaluronan 30 MG/2ML  Outcome: tolerated well, no immediate complications  Procedure, treatment alternatives, risks and benefits explained, specific risks discussed. Consent was given by the patient. Immediately prior to procedure a time out was called to verify the correct patient, procedure, equipment, support staff and site/side marked as required. Patient was prepped and draped in the usual sterile fashion.

## 2025-03-06 ENCOUNTER — TELEPHONE (OUTPATIENT)
Dept: UROLOGY | Facility: CLINIC | Age: 70
End: 2025-03-06
Payer: MEDICARE

## 2025-03-06 DIAGNOSIS — N28.1 RENAL CYST: Primary | ICD-10-CM

## 2025-03-06 DIAGNOSIS — N20.0 NEPHROLITHIASIS: ICD-10-CM

## 2025-03-06 NOTE — TELEPHONE ENCOUNTER
"Relay     \"LVM for patient. Yes, imaging is needed prior to follow up based off last office note. However the [facility] order expires today due to being ordered on 3/6/2024. Informed patient in the VM, we would reach back out to her, or she could call back in. If she wants imaging done within [facility], patient will need to contact central scheduling once the new order is placed, if she wants it outside of [facility] facility, our schedulers will help handle scheduling. \"                 "

## 2025-03-06 NOTE — TELEPHONE ENCOUNTER
PT called to see if she needed imaging before her appt on 03/12/25. She had one in [place] but stated it was cancelled by [facility] and she can't make the one in [place]. I told her she can call central scheduling and reschedule it but she said she doesn't want to call them and they wont reschedule it but wouldn't tell her why. I let her know if Dr camarillo wants imaging her FU with us will need to be rescheduled as well.

## 2025-03-06 NOTE — TELEPHONE ENCOUNTER
----- Message from Krystle FORD sent at 3/6/2025  1:27 PM EST -----  Please reach out to the patient and get her appt with Dr. Nettles on 3/12 rescheduled to after her imaging appt which is [facility]. 3/24.    Thank you

## 2025-03-24 ENCOUNTER — HOSPITAL ENCOUNTER (OUTPATIENT)
Dept: ULTRASOUND IMAGING | Facility: HOSPITAL | Age: 70
Discharge: HOME | End: 2025-03-24
Admitting: UROLOGY
Payer: MEDICARE

## 2025-03-24 DIAGNOSIS — N28.1 RENAL CYST: ICD-10-CM

## 2025-03-24 DIAGNOSIS — N20.0 NEPHROLITHIASIS: ICD-10-CM

## 2025-03-24 PROCEDURE — 76775 US EXAM ABDO BACK WALL LIM: CPT

## 2025-04-02 ENCOUNTER — OFFICE VISIT (OUTPATIENT)
Dept: UROLOGY | Facility: CLINIC | Age: 70
End: 2025-04-02
Payer: MEDICARE

## 2025-04-02 VITALS — BODY MASS INDEX: 27.6 KG/M2 | HEIGHT: 62 IN | WEIGHT: 150 LBS

## 2025-04-02 DIAGNOSIS — N28.1 RENAL CYST: ICD-10-CM

## 2025-04-02 DIAGNOSIS — N20.0 NEPHROLITHIASIS: Primary | ICD-10-CM

## 2025-04-02 PROCEDURE — 99214 OFFICE O/P EST MOD 30 MIN: CPT | Performed by: UROLOGY

## 2025-04-02 PROCEDURE — 1160F RVW MEDS BY RX/DR IN RCRD: CPT | Performed by: UROLOGY

## 2025-04-02 PROCEDURE — 1159F MED LIST DOCD IN RCRD: CPT | Performed by: UROLOGY

## 2025-04-02 NOTE — PROGRESS NOTES
Office Note Kidney Stone      Patient Name: Juan Marcial  : 1955   MRN: [id number]     Chief Complaint: History of Nephrolithiasis/Ureterolithiasis       History of Present Illness: Juan Marcial is a 69 y.o. female who presents today for evaluation secondary to history of renal cyst and nephrolithiasis.  She has undergone updated ultrasound.  Denies current flank pain, acute stone symptoms.  Denies bothersome lower urinary tract symptoms.      Subjective      Review of System: Review of Systems   Genitourinary:  Negative for decreased urine volume, difficulty urinating, dysuria, enuresis, flank pain, frequency, hematuria and urgency.        I have reviewed the ROS documented by my clinical staff, updated as appropriate and I agree. Stan Nettles MD    Past Medical History:   Past Medical History:   Diagnosis Date    Anesthesia complication     can be triggered by men 60s and older, gray hair due to PTSD    Arthritis of back     Arthritis of neck     Cancer     basal cell/skin    Claustrophobia     Heart murmur     Hyperlipidemia     Kidney stone     Knee swelling     Post traumatic stress disorder     can be triggered by men 60s and older, gray hair due to PTSD    Scoliosis 1969    Tear of meniscus of knee        Past Surgical History:   Past Surgical History:   Procedure Laterality Date    BREAST SURGERY Right     benign tumor removed    CATARACT EXTRACTION Bilateral 2017    EYE SURGERY Bilateral     floaters removed    HAND SURGERY Right     middle finger joint    KIDNEY STONE SURGERY      MOHS SURGERY Right     right side of nose, basal cell carcinoma    MOHS SURGERY Left 2016    left side of nose, basal cell carcinoma    RETINAL DETACHMENT SURGERY Left 2019    SHOULDER SURGERY Right     removed calcified bone chip    SKIN CANCER EXCISION      multiple on face starting from age 26    URETEROSCOPY LASER LITHOTRIPSY WITH STENT INSERTION Right 2022     Procedure: URETEROSCOPY LASER LITHOTRIPSY WITH STENT INSERTION;  Surgeon: Stan Nettles MD;  Location: [facility];  Service: Urology;  Laterality: Right;       Family History:   Family History   Problem Relation Age of Onset    Pulmonary fibrosis Father     Heart disease Father     Hypertension Father     Diabetes Mother     Cancer Sister     Nephrolithiasis Sister     Cancer Paternal Aunt        Social History:   Social History     Socioeconomic History    Marital status:    Tobacco Use    Smoking status: Never     Passive exposure: Never    Smokeless tobacco: Never   Vaping Use    Vaping status: Never Used   Substance and Sexual Activity    Alcohol use: Never    Drug use: Never    Sexual activity: Not Currently     Partners: Male       Medications:     Current Outpatient Medications:     Astepro 205.5 MCG/SPRAY solution nasal spray, INHALE 2 SPRAYS IN EACH NOSTRIL TWICE DAILY, Disp: , Rfl:     Diclofenac Sodium (VOLTAREN) 1 % gel gel, APPLY 2-4 GRAMS TOPICALLY TO THE AFFECTED AREA(S) 3-4 TIMES A DAY AS NEEDED, Disp: , Rfl:     FLUoxetine (PROzac) 20 MG capsule, TAKE 1 CAPSULE (20 MG) BY MOUTH 1 (ONE) TIME EACH DAY, Disp: , Rfl:     meloxicam (MOBIC) 7.5 MG tablet, Take 1 tablet by mouth Daily., Disp: , Rfl:     Multiple Vitamin (MULTIVITAMINS PO), Take  by mouth., Disp: , Rfl:     Turmeric 500 MG capsule, Take  by mouth., Disp: , Rfl:     Allergies:   Allergies   Allergen Reactions    Morphine Other (See Comments)     morphine    Aleve [Naproxen] Unknown - Low Severity     Does not remember reaction, possibly an ingredient in Aleve    Dust Mite Extract Other (See Comments)     Runny nose, itchy/watery eyes, congestion    Lactose Intolerance (Gi) GI Intolerance     Dairy products    Molds & Smuts Unknown (See Comments)     Runny nose, itchy/watery eyes, congestion    Other Rash     Plastic tape    Shellfish Allergy Rash     Last reaction in childhood, has tolerated IV contrast since    Sulfa Antibiotics  "Rash    Wheat Unknown (See Comments)     joint inflammation       Objective     Physical Exam:   Vital Signs:   Vitals:    04/02/25 0828   Weight: 68 kg (150 lb)   Height: 158 cm (62.21\")     Body mass index is 27.25 kg/m².     Physical Exam  Vitals and nursing note reviewed.   Constitutional:       Appearance: Normal appearance.   HENT:      Head: Normocephalic and atraumatic.   Cardiovascular:      Comments: Well perfused  Pulmonary:      Effort: Pulmonary effort is normal.   Abdominal:      General: Abdomen is flat.      Palpations: Abdomen is soft.   Musculoskeletal:         General: Normal range of motion.   Skin:     General: Skin is warm and dry.   Neurological:      General: No focal deficit present.      Mental Status: She is alert and oriented to person, place, and time. Mental status is at baseline.   Psychiatric:         Mood and Affect: Mood normal.         Behavior: Behavior normal.         Thought Content: Thought content normal.         Judgment: Judgment normal.         Labs:   Brief Urine Lab Results       None                 Lab Results   Component Value Date    CALCIUM 9.3 07/21/2022     07/21/2022    K 4.1 07/21/2022    CO2 24 07/21/2022     07/21/2022    BUN 12 07/21/2022    CREATININE 0.90 03/04/2024    EGFRIFAFRI >60 07/21/2022    EGFRIFNONA >60 07/21/2022    BCR 15 07/21/2022    ANIONGAP 11 07/21/2022       Lab Results   Component Value Date    WBC 6.57 02/06/2024    HGB 14.5 02/06/2024    HCT 45.9 (H) 02/06/2024    MCV 95 02/06/2024     (H) 02/06/2024         Images:   US Renal Bilateral  Result Date: 3/28/2025  Impression: Cortical-based left renal cysts simple in appearance. Electronically Signed: Italia Curtis MD  3/28/2025 11:16 AM EDT  Workstation ID: [id number]          Measures:   Tobacco:   Juan Marcial  reports that she has never smoked. She has never been exposed to tobacco smoke. She has never used smokeless tobacco. I have educated her on the risk of " diseases from using tobacco product.          Urine Incontinence: ( NOUI)  Patient reports that she is not currently experiencing any symptoms of urinary incontinence.       Assessment / Plan      Assessment/Plan:   Juan Marcial is a 69 y.o. female who presented today with nephrolithiasis/ureterolithiasis and renal cyst.  Ultrasound today with stable benign simple renal cyst.  We have discussed this in depth.  Does not require continued surveillance.  No evidence of nephrolithiasis.  We have continued to encourage conservative strategies to reduce stone risk.  She will follow-up in 1 year with updated imaging, alert with any acute stone symptoms prior.    Diagnoses and all orders for this visit:    1. Nephrolithiasis (Primary)  -     US Renal Bilateral; Future    2. Renal cyst           Follow Up:   Return in about 1 year (around 4/2/2026) for Recheck, Follow up after Imaging.    I spent approximately 30 minutes providing clinical care for this patient; including review of patient's chart and provider documentation, face to face time spent with patient in examination room (obtaining history, performing physical exam, discussing diagnosis and management options), placing orders, and completing patient documentation.     Stan Nettles MD  [facility] [place]

## (undated) DEVICE — NITINOL WIRE WITH HYDROPHILIC TIP: Brand: SENSOR

## (undated) DEVICE — FIBR LASR SOLTIVE BALL/TP 200MICRON 1P/U

## (undated) DEVICE — GLV SURG BIOGEL LTX PF 7 1/2

## (undated) DEVICE — PK CYSTO-TUR BASIC 10

## (undated) DEVICE — SHEATH ACC URETRL UROPASS 11/13F 38CM EA/5